# Patient Record
Sex: FEMALE | Race: WHITE | NOT HISPANIC OR LATINO | Employment: OTHER
[De-identification: names, ages, dates, MRNs, and addresses within clinical notes are randomized per-mention and may not be internally consistent; named-entity substitution may affect disease eponyms.]

---

## 2017-01-06 ENCOUNTER — HOSPITAL (OUTPATIENT)
Dept: OTHER | Age: 57
End: 2017-01-06
Attending: OBSTETRICS & GYNECOLOGY

## 2017-05-02 ENCOUNTER — TELEPHONE (OUTPATIENT)
Dept: INTERNAL MEDICINE CLINIC | Facility: CLINIC | Age: 57
End: 2017-05-02

## 2017-05-02 NOTE — TELEPHONE ENCOUNTER
Patient experiencing high anxiety and would only like to see Dr Deepa Ling. Reviewed availability with her and she does not want to wait that long and told her a nurse would have to triage her symptoms. Offered her Dearl John or Dr Ashely Guillermo and again declined.   P

## 2017-05-02 NOTE — TELEPHONE ENCOUNTER
Pt with increased stress and difficulty sleeping. Requesting appointment. Declines appointment with another provider.   Pt scheduled with Dr Clyde King on 5/09 at 1 pm.

## 2017-05-09 ENCOUNTER — OFFICE VISIT (OUTPATIENT)
Dept: INTERNAL MEDICINE CLINIC | Facility: CLINIC | Age: 57
End: 2017-05-09

## 2017-05-09 VITALS
HEIGHT: 68 IN | OXYGEN SATURATION: 97 % | BODY MASS INDEX: 22.85 KG/M2 | WEIGHT: 150.75 LBS | SYSTOLIC BLOOD PRESSURE: 122 MMHG | RESPIRATION RATE: 16 BRPM | HEART RATE: 83 BPM | TEMPERATURE: 98 F | DIASTOLIC BLOOD PRESSURE: 82 MMHG

## 2017-05-09 DIAGNOSIS — Z00.00 ROUTINE GENERAL MEDICAL EXAMINATION AT A HEALTH CARE FACILITY: Primary | ICD-10-CM

## 2017-05-09 DIAGNOSIS — F43.0 ANXIETY IN ACUTE STRESS REACTION: ICD-10-CM

## 2017-05-09 DIAGNOSIS — F41.1 ANXIETY IN ACUTE STRESS REACTION: ICD-10-CM

## 2017-05-09 PROCEDURE — 99396 PREV VISIT EST AGE 40-64: CPT | Performed by: INTERNAL MEDICINE

## 2017-05-09 RX ORDER — ACETAMINOPHEN 160 MG
2000 TABLET,DISINTEGRATING ORAL DAILY
COMMUNITY
End: 2018-05-10

## 2017-05-09 RX ORDER — ALPRAZOLAM 0.25 MG/1
0.25 TABLET ORAL 2 TIMES DAILY PRN
Qty: 60 TABLET | Refills: 0 | Status: SHIPPED | OUTPATIENT
Start: 2017-05-09 | End: 2020-07-23

## 2017-05-09 NOTE — PROGRESS NOTES
Patient presents with: Other: Wellness exam; work-related stress      HPI: The pt presents today for wellness exam and also for eval of work-related stress. She's due for wellness labs.   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 Ayers Bolds Disorder Mother    • Hypertension Brother    • Other[other] [OTHER] Brother    • Heart Disease Brother    • Heart Disease Other      fam hx   • Heart Disease Maternal Grandmother          Immunization History  Administered            Date(s) Administered as Consulting Physician ([facility])  Moe Zamorano MD as Consulting Physician (Cardiovascular Diseases)

## 2017-05-11 PROBLEM — I49.3 PVC (PREMATURE VENTRICULAR CONTRACTION): Status: ACTIVE | Noted: 2017-05-11

## 2017-05-11 PROBLEM — I34.0 NON-RHEUMATIC MITRAL REGURGITATION: Status: ACTIVE | Noted: 2017-05-11

## 2017-05-18 ENCOUNTER — LAB ENCOUNTER (OUTPATIENT)
Dept: LAB | Age: 57
End: 2017-05-18
Attending: INTERNAL MEDICINE
Payer: COMMERCIAL

## 2017-05-18 DIAGNOSIS — Z00.00 ROUTINE GENERAL MEDICAL EXAMINATION AT A HEALTH CARE FACILITY: ICD-10-CM

## 2017-05-18 PROCEDURE — 82306 VITAMIN D 25 HYDROXY: CPT | Performed by: INTERNAL MEDICINE

## 2017-05-18 PROCEDURE — 80061 LIPID PANEL: CPT | Performed by: INTERNAL MEDICINE

## 2017-05-18 PROCEDURE — 83036 HEMOGLOBIN GLYCOSYLATED A1C: CPT | Performed by: INTERNAL MEDICINE

## 2017-05-18 PROCEDURE — 81003 URINALYSIS AUTO W/O SCOPE: CPT | Performed by: INTERNAL MEDICINE

## 2017-05-18 PROCEDURE — 80050 GENERAL HEALTH PANEL: CPT | Performed by: INTERNAL MEDICINE

## 2017-05-18 PROCEDURE — 36415 COLL VENOUS BLD VENIPUNCTURE: CPT | Performed by: INTERNAL MEDICINE

## 2017-08-14 ENCOUNTER — OFFICE VISIT (OUTPATIENT)
Dept: INTERNAL MEDICINE CLINIC | Facility: CLINIC | Age: 57
End: 2017-08-14

## 2017-08-14 ENCOUNTER — LAB ENCOUNTER (OUTPATIENT)
Dept: LAB | Age: 57
End: 2017-08-14
Attending: INTERNAL MEDICINE
Payer: COMMERCIAL

## 2017-08-14 ENCOUNTER — OFFICE VISIT (OUTPATIENT)
Dept: FAMILY MEDICINE CLINIC | Facility: CLINIC | Age: 57
End: 2017-08-14

## 2017-08-14 VITALS
TEMPERATURE: 99 F | OXYGEN SATURATION: 99 % | HEIGHT: 68 IN | SYSTOLIC BLOOD PRESSURE: 130 MMHG | HEART RATE: 65 BPM | DIASTOLIC BLOOD PRESSURE: 80 MMHG | RESPIRATION RATE: 13 BRPM | BODY MASS INDEX: 22.58 KG/M2 | WEIGHT: 149 LBS

## 2017-08-14 VITALS
RESPIRATION RATE: 18 BRPM | TEMPERATURE: 98 F | WEIGHT: 147 LBS | SYSTOLIC BLOOD PRESSURE: 118 MMHG | HEIGHT: 68 IN | HEART RATE: 65 BPM | BODY MASS INDEX: 22.28 KG/M2 | DIASTOLIC BLOOD PRESSURE: 82 MMHG | OXYGEN SATURATION: 99 %

## 2017-08-14 DIAGNOSIS — F41.9 ANXIETY: ICD-10-CM

## 2017-08-14 DIAGNOSIS — Z02.9 ENCOUNTER FOR ADMINISTRATIVE EXAMINATIONS, UNSPECIFIED: Primary | ICD-10-CM

## 2017-08-14 DIAGNOSIS — I34.1 MITRAL VALVE PROLAPSE: ICD-10-CM

## 2017-08-14 DIAGNOSIS — R23.3 PETECHIAL RASH: ICD-10-CM

## 2017-08-14 DIAGNOSIS — R23.3 PETECHIAL RASH: Primary | ICD-10-CM

## 2017-08-14 DIAGNOSIS — E55.9 VITAMIN D INSUFFICIENCY: ICD-10-CM

## 2017-08-14 DIAGNOSIS — I49.3 PVC (PREMATURE VENTRICULAR CONTRACTION): ICD-10-CM

## 2017-08-14 LAB
ALBUMIN SERPL-MCNC: 3.9 G/DL (ref 3.5–4.8)
ALP LIVER SERPL-CCNC: 59 U/L (ref 46–118)
ALT SERPL-CCNC: 24 U/L (ref 14–54)
AST SERPL-CCNC: 15 U/L (ref 15–41)
BASOPHILS # BLD AUTO: 0.04 X10(3) UL (ref 0–0.1)
BASOPHILS NFR BLD AUTO: 0.9 %
BILIRUB SERPL-MCNC: 0.5 MG/DL (ref 0.1–2)
BUN BLD-MCNC: 13 MG/DL (ref 8–20)
CALCIUM BLD-MCNC: 9.1 MG/DL (ref 8.3–10.3)
CHLORIDE: 106 MMOL/L (ref 101–111)
CO2: 31 MMOL/L (ref 22–32)
CONTROL LINE PRESENT WITH A CLEAR BACKGROUND (YES/NO): YES YES/NO
CREAT BLD-MCNC: 0.92 MG/DL (ref 0.55–1.02)
EOSINOPHIL # BLD AUTO: 0.09 X10(3) UL (ref 0–0.3)
EOSINOPHIL NFR BLD AUTO: 2.1 %
ERYTHROCYTE [DISTWIDTH] IN BLOOD BY AUTOMATED COUNT: 12.4 % (ref 11.5–16)
GLUCOSE BLD-MCNC: 91 MG/DL (ref 70–99)
HCT VFR BLD AUTO: 38.8 % (ref 34–50)
HGB BLD-MCNC: 12.8 G/DL (ref 12–16)
IMMATURE GRANULOCYTE COUNT: 0 X10(3) UL (ref 0–1)
IMMATURE GRANULOCYTE RATIO %: 0 %
INR BLD: 1.05 (ref 0.89–1.11)
KIT EXPIRATION DATE: NORMAL DATE
KIT LOT #: NORMAL NUMERIC
LYMPHOCYTES # BLD AUTO: 1.83 X10(3) UL (ref 0.9–4)
LYMPHOCYTES NFR BLD AUTO: 43 %
M PROTEIN MFR SERPL ELPH: 7.4 G/DL (ref 6.1–8.3)
MCH RBC QN AUTO: 28.3 PG (ref 27–33.2)
MCHC RBC AUTO-ENTMCNC: 33 G/DL (ref 31–37)
MCV RBC AUTO: 85.7 FL (ref 81–100)
MONOCYTES # BLD AUTO: 0.39 X10(3) UL (ref 0.1–0.6)
MONOCYTES NFR BLD AUTO: 9.2 %
NEUTROPHIL ABS PRELIM: 1.91 X10 (3) UL (ref 1.3–6.7)
NEUTROPHILS # BLD AUTO: 1.91 X10(3) UL (ref 1.3–6.7)
NEUTROPHILS NFR BLD AUTO: 44.8 %
PLATELET # BLD AUTO: 185 10(3)UL (ref 150–450)
POTASSIUM SERPL-SCNC: 4.5 MMOL/L (ref 3.6–5.1)
PSA SERPL DL<=0.01 NG/ML-MCNC: 13.7 SECONDS (ref 12–14.3)
RBC # BLD AUTO: 4.53 X10(6)UL (ref 3.8–5.1)
RED CELL DISTRIBUTION WIDTH-SD: 39 FL (ref 35.1–46.3)
SODIUM SERPL-SCNC: 142 MMOL/L (ref 136–144)
WBC # BLD AUTO: 4.3 X10(3) UL (ref 4–13)

## 2017-08-14 PROCEDURE — 99214 OFFICE O/P EST MOD 30 MIN: CPT | Performed by: INTERNAL MEDICINE

## 2017-08-14 PROCEDURE — 85610 PROTHROMBIN TIME: CPT | Performed by: INTERNAL MEDICINE

## 2017-08-14 PROCEDURE — 36415 COLL VENOUS BLD VENIPUNCTURE: CPT | Performed by: INTERNAL MEDICINE

## 2017-08-14 PROCEDURE — 85025 COMPLETE CBC W/AUTO DIFF WBC: CPT | Performed by: INTERNAL MEDICINE

## 2017-08-14 PROCEDURE — 87880 STREP A ASSAY W/OPTIC: CPT | Performed by: INTERNAL MEDICINE

## 2017-08-14 PROCEDURE — 85730 THROMBOPLASTIN TIME PARTIAL: CPT | Performed by: INTERNAL MEDICINE

## 2017-08-14 PROCEDURE — 80053 COMPREHEN METABOLIC PANEL: CPT | Performed by: INTERNAL MEDICINE

## 2017-08-14 RX ORDER — CHLORAL HYDRATE 500 MG
1000 CAPSULE ORAL DAILY
COMMUNITY

## 2017-08-14 NOTE — PROGRESS NOTES
Pt presented with small red spots to bilateral lower extremities for approximately 1 week that have been increasing in number. Per patient it goes from ankles to thighs. No pain or drainage. Today, she is feeling tired and not her self.  She started to

## 2017-08-14 NOTE — PROGRESS NOTES
Frieda Bluntkeisha Daily is a 64year old female. HPI:   Patient presents with:  Derm Problem: bilateral lower extrem  Patient presents with acute dermatological complaint. She has developed a rash on her legs. Thinks it started in past week. Not pruritic. dryness (11/01/2011); Myalgia and myositis, unspecified (11/01/2011); Open wound of toe(s), without mention of complication (27/70/4011); Pain in joint, multiple sites (11/01/2011);  Phlebitis and thrombophlebitis of superficial vessels of lower extremities rash.  Contact dermatitis possibility - using sister's fabric softener as of last week. Rapid strep negative. Will check CBC, CMP, PT, PTT as well. Advised patient to inform us if rash worsens. 2.  Anxiety  Stable.   Continue PRN alprazolam.      3/

## 2017-08-14 NOTE — PATIENT INSTRUCTIONS
- Your rapid strep was   - We will check blood work today - will contact you with the results tomorrow. - Call us with any difficulty breathing, drainage from your rash. It was a pleasure seeing you in the clinic today.   Thank you for choosing the [facility]

## 2017-08-15 LAB — APTT PPP: 34.7 SECONDS (ref 25–34)

## 2017-09-08 ENCOUNTER — OFFICE VISIT (OUTPATIENT)
Dept: FAMILY MEDICINE CLINIC | Facility: CLINIC | Age: 57
End: 2017-09-08

## 2017-09-08 VITALS
HEIGHT: 68 IN | BODY MASS INDEX: 22.58 KG/M2 | OXYGEN SATURATION: 99 % | TEMPERATURE: 98 F | WEIGHT: 149 LBS | SYSTOLIC BLOOD PRESSURE: 118 MMHG | DIASTOLIC BLOOD PRESSURE: 88 MMHG | RESPIRATION RATE: 16 BRPM | HEART RATE: 62 BPM

## 2017-09-08 DIAGNOSIS — R05.9 COUGH: Primary | ICD-10-CM

## 2017-09-08 DIAGNOSIS — J02.9 SORE THROAT: ICD-10-CM

## 2017-09-08 PROCEDURE — 99213 OFFICE O/P EST LOW 20 MIN: CPT | Performed by: NURSE PRACTITIONER

## 2017-09-08 RX ORDER — BENZONATATE 200 MG/1
200 CAPSULE ORAL 3 TIMES DAILY PRN
Qty: 30 CAPSULE | Refills: 0 | Status: SHIPPED | OUTPATIENT
Start: 2017-09-08 | End: 2017-09-18

## 2017-09-08 NOTE — PROGRESS NOTES
HPI:   Dodie Espinoza Daily is a 64year old female who presents with ill symptoms for  4-5 days. Patient reports sore throat, congestion, dry cough, chest pain from coughing, body aches, denies fever. Has tried mucinex  with not much relief.        Current Out • Thyroid Disorder Mother    • Heart Disease Other      fam hx   • Heart Disease Maternal Grandmother       Smoking status: Never Smoker                                                              Smokeless tobacco: Never Used                      [employer] [employer] shortness of breath or inability to swallow. The patient indicates understanding of these issues and agrees to the plan.

## 2017-09-08 NOTE — PATIENT INSTRUCTIONS
· Please start Ibuprofen 600 mg every six to eight hours for pain/swelling. · Start Tessalon perles every eight hours with large glass of water for cough. · May continue to use tea with honey or other soothing drinks. Drink often.  Hot steam showers or h

## 2017-09-09 ENCOUNTER — MOBILE ENCOUNTER (OUTPATIENT)
Dept: INTERNAL MEDICINE CLINIC | Facility: CLINIC | Age: 57
End: 2017-09-09

## 2017-09-10 ENCOUNTER — TELEPHONE (OUTPATIENT)
Dept: INTERNAL MEDICINE CLINIC | Facility: CLINIC | Age: 57
End: 2017-09-10

## 2017-09-10 NOTE — TELEPHONE ENCOUNTER
Patient's sister Erma Nesbitt called stating patient's cough was still uncontrolled. The benzonatate perrles were not helping. She was seen by IC on day prior.  I have called in cheratussin for patient and asked them to return to PCP if cough continues not to

## 2017-09-11 ENCOUNTER — TELEPHONE (OUTPATIENT)
Dept: INTERNAL MEDICINE CLINIC | Facility: CLINIC | Age: 57
End: 2017-09-11

## 2017-09-11 NOTE — TELEPHONE ENCOUNTER
Called pt to get more info.  Explained the difference between bacterial infection vs viral infection and pt verbalized to have s&s getting worse/ stated chest pain when coughing and \"feels like bronchitis\"/ x2 episodes of cough spells \"to the point unabl

## 2017-09-11 NOTE — TELEPHONE ENCOUNTER
Patient continues to have cough/nothing is better; increase in congestion- she requested an antibiotic be called in for her.  Please callback to discuss and triage

## 2017-09-12 RX ORDER — AZITHROMYCIN 250 MG/1
TABLET, FILM COATED ORAL
Qty: 6 TABLET | Refills: 0 | Status: SHIPPED | OUTPATIENT
Start: 2017-09-12 | End: 2018-05-10

## 2017-09-12 NOTE — TELEPHONE ENCOUNTER
Call patient. She's been symptomatic essentially since the beginning of the month despite adherence to many conservative measures. I've gone ahead and sent a Z-raimundo to her pharmacy. I would only do a CXR if she doesn't improve. Maximilian Barragan.  Suzan Palomo, [facility] Yanira Nicole

## 2017-12-02 ENCOUNTER — TELEPHONE (OUTPATIENT)
Dept: INTERNAL MEDICINE CLINIC | Facility: CLINIC | Age: 57
End: 2017-12-02

## 2017-12-02 NOTE — TELEPHONE ENCOUNTER
Call patient. Did she get a mammogram this calendar year from her OB/GYN Dr. Payam Arceo? If YES, then please secure a copy of the report. If NO, ask patient if she'd like us to order her test.    Anu ANDINO.  Stacey Cooper MD  Diplomate, American Board of Internal Med

## 2018-01-10 ENCOUNTER — TELEPHONE (OUTPATIENT)
Dept: INTERNAL MEDICINE CLINIC | Facility: CLINIC | Age: 58
End: 2018-01-10

## 2018-01-10 DIAGNOSIS — Z00.00 ROUTINE GENERAL MEDICAL EXAMINATION AT A HEALTH CARE FACILITY: Primary | ICD-10-CM

## 2018-01-10 NOTE — TELEPHONE ENCOUNTER
Patient calling to request an order for lab work. Has appointment on 03/01/18.  Please call patient back

## 2018-01-12 NOTE — TELEPHONE ENCOUNTER
Labs ordered, please get them done 1 week prior to appt. Kassi Naqvi. Gwendlyn Jeans, MD  Diplomate, American Board of Internal Medicine  [address] [facility]  [address] [place] [address],[address], [address], [facility], [address]  T: [phone]; F: [address]

## 2018-01-16 ENCOUNTER — HOSPITAL (OUTPATIENT)
Dept: OTHER | Age: 58
End: 2018-01-16
Attending: OBSTETRICS & GYNECOLOGY

## 2018-01-28 ENCOUNTER — TELEPHONE (OUTPATIENT)
Dept: INTERNAL MEDICINE CLINIC | Facility: CLINIC | Age: 58
End: 2018-01-28

## 2018-01-28 NOTE — TELEPHONE ENCOUNTER
Call patient. I'm doing some record reviews and I don't see her as ever having done colorectal cancer screening via a colonoscopy of via a FIT test.  Ask her which one she'd like to do.   C-scope presumably once every 10 years or stool analysis (FIT) test

## 2018-01-29 NOTE — TELEPHONE ENCOUNTER
Pt stated she had her last colonoscopy done with [address] [place] [address]  when she was 48years old and not due until 10 years from that date.

## 2018-01-30 NOTE — TELEPHONE ENCOUNTER
Spoke with Tracey Chi at [facility] GI report will be faxed to Dr Deniz Mejia.

## 2018-05-01 ENCOUNTER — LAB ENCOUNTER (OUTPATIENT)
Dept: LAB | Age: 58
End: 2018-05-01
Attending: INTERNAL MEDICINE
Payer: COMMERCIAL

## 2018-05-01 DIAGNOSIS — Z00.00 ROUTINE GENERAL MEDICAL EXAMINATION AT A HEALTH CARE FACILITY: ICD-10-CM

## 2018-05-01 PROCEDURE — 80050 GENERAL HEALTH PANEL: CPT | Performed by: INTERNAL MEDICINE

## 2018-05-01 PROCEDURE — 82306 VITAMIN D 25 HYDROXY: CPT | Performed by: INTERNAL MEDICINE

## 2018-05-01 PROCEDURE — 83036 HEMOGLOBIN GLYCOSYLATED A1C: CPT | Performed by: INTERNAL MEDICINE

## 2018-05-01 PROCEDURE — 36415 COLL VENOUS BLD VENIPUNCTURE: CPT | Performed by: INTERNAL MEDICINE

## 2018-05-01 PROCEDURE — 80061 LIPID PANEL: CPT | Performed by: INTERNAL MEDICINE

## 2018-05-01 PROCEDURE — 81003 URINALYSIS AUTO W/O SCOPE: CPT | Performed by: INTERNAL MEDICINE

## 2018-05-10 ENCOUNTER — OFFICE VISIT (OUTPATIENT)
Dept: INTERNAL MEDICINE CLINIC | Facility: CLINIC | Age: 58
End: 2018-05-10

## 2018-05-10 VITALS
RESPIRATION RATE: 13 BRPM | TEMPERATURE: 99 F | HEIGHT: 68 IN | SYSTOLIC BLOOD PRESSURE: 124 MMHG | HEART RATE: 66 BPM | OXYGEN SATURATION: 99 % | BODY MASS INDEX: 23.79 KG/M2 | DIASTOLIC BLOOD PRESSURE: 78 MMHG | WEIGHT: 157 LBS

## 2018-05-10 DIAGNOSIS — Z00.00 ROUTINE GENERAL MEDICAL EXAMINATION AT A HEALTH CARE FACILITY: Primary | ICD-10-CM

## 2018-05-10 PROCEDURE — 99396 PREV VISIT EST AGE 40-64: CPT | Performed by: INTERNAL MEDICINE

## 2018-05-10 RX ORDER — IBUPROFEN 600 MG/1
600 TABLET ORAL 3 TIMES DAILY PRN
Qty: 90 TABLET | Refills: 0 | Status: SHIPPED | OUTPATIENT
Start: 2018-05-10 | End: 2026-05-10

## 2018-05-10 RX ORDER — FLUTICASONE PROPIONATE 50 MCG
2 SPRAY, SUSPENSION (ML) NASAL DAILY
Qty: 1 BOTTLE | Refills: 1 | Status: SHIPPED | OUTPATIENT
Start: 2018-05-10 | End: 2019-04-13 | Stop reason: ALTCHOICE

## 2018-05-10 NOTE — PROGRESS NOTES
Patient presents with:  CPX      HPI: The pt presents today for WWE minus the pap and minus the CBE. Up to date on screening metrics (mammo, pap, CRC). Health goals continue to center around longevity, vitality, and QOL.     Review of Systems   Constituti Age of Onset   • Cancer Father      prostate cancer   • Heart Disorder Father    • Hypertension Father    • Hypertension Brother    • Other[other] [OTHER] Brother    • Heart Disease Brother    • Hypertension Mother    • Other[other] [OTHER] Mother    • Thy 70 - 99 mg/dL 84   BUN      8 - 20 mg/dL 14   CREATININE      0.55 - 1.02 mg/dL 1.00   GFR, Non-      >=60 63   GFR, -American      >=60 72   CALCIUM      8.3 - 10.3 mg/dL 9.1   ALKALINE PHOSPHATASE      46 - 118 U/L 51   AST (SGOT) ability. Bernadine Summers. Deniz Mejia MD  Diplomate, American Board of Internal Medicine  [address] [address] [address]  [facility],4Th Floor, [address], [facility], [address] [place] [address]  T: [phone]; F: [address]     Meds & Refills for this Visit:  No prescriptions requested or or

## 2019-03-11 ENCOUNTER — OFFICE VISIT (OUTPATIENT)
Dept: INTERNAL MEDICINE CLINIC | Facility: CLINIC | Age: 59
End: 2019-03-11
Payer: COMMERCIAL

## 2019-03-11 ENCOUNTER — LAB ENCOUNTER (OUTPATIENT)
Dept: LAB | Age: 59
End: 2019-03-11
Attending: INTERNAL MEDICINE
Payer: COMMERCIAL

## 2019-03-11 VITALS
BODY MASS INDEX: 23.66 KG/M2 | TEMPERATURE: 99 F | WEIGHT: 150.75 LBS | RESPIRATION RATE: 16 BRPM | DIASTOLIC BLOOD PRESSURE: 82 MMHG | SYSTOLIC BLOOD PRESSURE: 136 MMHG | HEART RATE: 68 BPM | HEIGHT: 66.75 IN

## 2019-03-11 DIAGNOSIS — R68.2 DRY MOUTH: Primary | ICD-10-CM

## 2019-03-11 DIAGNOSIS — L60.3 SPLITTING OF NAIL: ICD-10-CM

## 2019-03-11 DIAGNOSIS — R68.2 DRY MOUTH: ICD-10-CM

## 2019-03-11 LAB
DEPRECATED HBV CORE AB SER IA-ACNC: 35.1 NG/ML (ref 18–340)
FOLATE SERPL-MCNC: 54 NG/ML (ref 8.7–?)
IGA SERPL-MCNC: 140 MG/DL (ref 70–312)
IRON SATURATION: 21 % (ref 15–50)
IRON SERPL-MCNC: 78 UG/DL (ref 50–170)
TOTAL IRON BINDING CAPACITY: 380 UG/DL (ref 240–450)
TRANSFERRIN SERPL-MCNC: 255 MG/DL (ref 200–360)
TSI SER-ACNC: 1.73 MIU/ML (ref 0.36–3.74)
VIT B12 SERPL-MCNC: 967 PG/ML (ref 193–986)

## 2019-03-11 PROCEDURE — 84443 ASSAY THYROID STIM HORMONE: CPT | Performed by: INTERNAL MEDICINE

## 2019-03-11 PROCEDURE — 86225 DNA ANTIBODY NATIVE: CPT | Performed by: INTERNAL MEDICINE

## 2019-03-11 PROCEDURE — 82728 ASSAY OF FERRITIN: CPT | Performed by: INTERNAL MEDICINE

## 2019-03-11 PROCEDURE — 86038 ANTINUCLEAR ANTIBODIES: CPT | Performed by: INTERNAL MEDICINE

## 2019-03-11 PROCEDURE — 99214 OFFICE O/P EST MOD 30 MIN: CPT | Performed by: INTERNAL MEDICINE

## 2019-03-11 PROCEDURE — 83540 ASSAY OF IRON: CPT | Performed by: INTERNAL MEDICINE

## 2019-03-11 PROCEDURE — 83550 IRON BINDING TEST: CPT | Performed by: INTERNAL MEDICINE

## 2019-03-11 PROCEDURE — 86256 FLUORESCENT ANTIBODY TITER: CPT | Performed by: INTERNAL MEDICINE

## 2019-03-11 PROCEDURE — 83516 IMMUNOASSAY NONANTIBODY: CPT | Performed by: INTERNAL MEDICINE

## 2019-03-11 PROCEDURE — 82784 ASSAY IGA/IGD/IGG/IGM EACH: CPT | Performed by: INTERNAL MEDICINE

## 2019-03-11 PROCEDURE — 36415 COLL VENOUS BLD VENIPUNCTURE: CPT | Performed by: INTERNAL MEDICINE

## 2019-03-11 PROCEDURE — 82607 VITAMIN B-12: CPT | Performed by: INTERNAL MEDICINE

## 2019-03-11 PROCEDURE — 82746 ASSAY OF FOLIC ACID SERUM: CPT | Performed by: INTERNAL MEDICINE

## 2019-03-11 PROCEDURE — 86235 NUCLEAR ANTIGEN ANTIBODY: CPT | Performed by: INTERNAL MEDICINE

## 2019-03-11 NOTE — PROGRESS NOTES
Patient presents with: Other: dry mouth   Other: nail issues       HPI: Merari Givens presents today for pronounced dry mouth along w/ brittle and splitting nails. Onset = at least the last few weeks, if not longer. No prior hx.   Has tried support measures w Position: Sitting, Cuff Size: adult)   Pulse 68   Temp 98.7 °F (37.1 °C) (Oral)   Resp 16   Ht 66.75\"   Wt 150 lb 12 oz   BMI 23.79 kg/m²   Gen - A&Ox3, NAD  HEENT - PERRL, EOMI, OP is clear w/ some dryness appreciated; TMs clear B  Neck - supple, no JVD,

## 2019-03-14 LAB
GLIAD (DEAMIDATED) AB, IGA: NEGATIVE
GLIAD (DEAMIDATED) AB, IGG: NEGATIVE
TISSUE TRANSGLUTAMINASE AB,IGA: 5.2 U/ML (ref ?–15)
TISSUE TRANSGLUTAMINASE IGA QUALITATIVE: NEGATIVE

## 2019-03-18 LAB
ANA SCREEN: POSITIVE
CENTROMERE AUTOAB: <100 AU/ML (ref ?–100)
DSDNA AUTOAB: <100 IU/ML (ref ?–100)
HISTONE AUTOAB: <100 AU/ML (ref ?–100)
JO-1 AUTOAB: <100 AU/ML (ref ?–100)
RNP AUTOAB: <100 AU/ML (ref ?–100)
SCL-70 AUTOAB: <100 AU/ML (ref ?–100)
SM AUTOAB (SMITH): <100 AU/ML (ref ?–100)
SSA AUTOAB: 408 AU/ML (ref ?–100)
SSB AUTOAB: <100 AU/ML (ref ?–100)

## 2019-04-11 ENCOUNTER — TELEPHONE (OUTPATIENT)
Dept: INTERNAL MEDICINE CLINIC | Facility: CLINIC | Age: 59
End: 2019-04-11

## 2019-04-11 NOTE — TELEPHONE ENCOUNTER
Pt informed of Dr Alex Shallow recommendation and pt verbalized to be upset. Pt has scheduled an ov debby Aviles NP for Saturday and also recommended if s/s get worse to proceed to ER pt verbalized understanding.

## 2019-04-11 NOTE — TELEPHONE ENCOUNTER
Pt called to request an abdominal ultrasound due to \"a previous gallbladder attack\". Pt stated had severe abdominal pain yesterday for a few hours and went to take a nap. Severe pain decrease, pt denied having pain any longer.    Pt denied as follows fe

## 2019-04-11 NOTE — TELEPHONE ENCOUNTER
Clinical examination is warranted. This can be done in the [facility] for rapid diagnosis and evaluation. Adams Hopper. Laury Hardin MD  Diplomate, American Board of Internal Medicine  [address] [facility]  [address], [address], [facility], [address]  T: [phone]

## 2019-04-11 NOTE — TELEPHONE ENCOUNTER
Patient calling for update on Dr Deepa Ling and either just going for ultrasound or coming in for an appointment.   Per note in chart we are still waiting for Dr Deepa Ling to advise, informed patient he is in clinic and seeing patient and nurse will call her when he

## 2019-04-13 ENCOUNTER — OFFICE VISIT (OUTPATIENT)
Dept: INTERNAL MEDICINE CLINIC | Facility: CLINIC | Age: 59
End: 2019-04-13
Payer: COMMERCIAL

## 2019-04-13 VITALS
WEIGHT: 148.75 LBS | DIASTOLIC BLOOD PRESSURE: 68 MMHG | BODY MASS INDEX: 23.35 KG/M2 | SYSTOLIC BLOOD PRESSURE: 122 MMHG | RESPIRATION RATE: 16 BRPM | OXYGEN SATURATION: 98 % | HEART RATE: 66 BPM | HEIGHT: 66.75 IN | TEMPERATURE: 98 F

## 2019-04-13 DIAGNOSIS — R10.13 EPIGASTRIC PAIN: Primary | ICD-10-CM

## 2019-04-13 DIAGNOSIS — R10.33 PERIUMBILICAL ABDOMINAL PAIN: ICD-10-CM

## 2019-04-13 PROCEDURE — 99213 OFFICE O/P EST LOW 20 MIN: CPT | Performed by: NURSE PRACTITIONER

## 2019-04-13 NOTE — PROGRESS NOTES
Patient presents with:  Abdominal Pain: Pt states pain was just going on for 1 day. Went away after nap. Pt Described pain as it hurts like hell, pt came back from lunch and her abdominal area was hard and pain intense. About 1:30 pt had to go home.  Pt sta 08/15/2012 16     ALT (U/L)   Date Value   05/01/2018 28   08/14/2017 24   05/18/2017 22   08/15/2012 23        Current Outpatient Medications:  Nutritional Supplements (JUICE PLUS FIBRE OR) Take by mouth.  Disp:  Rfl:    MISC NATURAL PRODUCT OP Apply to Smokeless tobacco: Never Used    Alcohol use: Yes      Comment: rare    Drug use: No        REVIEW OF SYSTEMS:   GENERAL: feels well otherwise, no lethargy, no fever, no chills  SKIN: denies any unusual skin lesions, rash  HEENT:no abnormal taste  LUNGS: d

## 2019-04-22 ENCOUNTER — HOSPITAL ENCOUNTER (OUTPATIENT)
Dept: ULTRASOUND IMAGING | Age: 59
Discharge: HOME | End: 2019-04-22
Attending: NURSE PRACTITIONER
Payer: COMMERCIAL

## 2019-04-22 DIAGNOSIS — R10.13 EPIGASTRIC PAIN: ICD-10-CM

## 2019-04-22 DIAGNOSIS — R10.33 PERIUMBILICAL ABDOMINAL PAIN: ICD-10-CM

## 2019-04-22 PROCEDURE — 76700 US EXAM ABDOM COMPLETE: CPT | Performed by: NURSE PRACTITIONER

## 2019-05-04 ENCOUNTER — HOSPITAL (OUTPATIENT)
Dept: OTHER | Age: 59
End: 2019-05-04
Attending: OBSTETRICS & GYNECOLOGY

## 2019-05-11 ENCOUNTER — TELEPHONE (OUTPATIENT)
Dept: INTERNAL MEDICINE CLINIC | Facility: CLINIC | Age: 59
End: 2019-05-11

## 2019-05-11 ENCOUNTER — OFFICE VISIT (OUTPATIENT)
Dept: INTERNAL MEDICINE CLINIC | Facility: CLINIC | Age: 59
End: 2019-05-11
Payer: COMMERCIAL

## 2019-05-11 VITALS
HEIGHT: 68 IN | DIASTOLIC BLOOD PRESSURE: 82 MMHG | TEMPERATURE: 98 F | OXYGEN SATURATION: 99 % | BODY MASS INDEX: 22.92 KG/M2 | RESPIRATION RATE: 16 BRPM | SYSTOLIC BLOOD PRESSURE: 110 MMHG | WEIGHT: 151.25 LBS | HEART RATE: 72 BPM

## 2019-05-11 DIAGNOSIS — Z00.00 ROUTINE GENERAL MEDICAL EXAMINATION AT A HEALTH CARE FACILITY: Primary | ICD-10-CM

## 2019-05-11 DIAGNOSIS — Z23 NEED FOR SHINGLES VACCINE: ICD-10-CM

## 2019-05-11 PROCEDURE — 90471 IMMUNIZATION ADMIN: CPT | Performed by: INTERNAL MEDICINE

## 2019-05-11 PROCEDURE — 90750 HZV VACC RECOMBINANT IM: CPT | Performed by: INTERNAL MEDICINE

## 2019-05-11 PROCEDURE — 99396 PREV VISIT EST AGE 40-64: CPT | Performed by: INTERNAL MEDICINE

## 2019-05-11 NOTE — TELEPHONE ENCOUNTER
Pt due for Shingrix #2 July 2019. Only willing to schedule on a Saturday either with nurse or provider. Pt states she will not get 2nd injection unless she is able to schedule on a Saturday     Ok to schedule on a Saturday ?

## 2019-05-11 NOTE — PATIENT INSTRUCTIONS
Medardo Harrington, my e-mail is [email or web address]. If I could be of any other service, please do not hesitate to contact me. Our office phone number is [phone], or please feel free to send us a secured message through [address]   On behalf of my staf

## 2019-05-11 NOTE — PROGRESS NOTES
Patient presents with:  Physical      HPI: Shaw Campos presents today for WWE. Doing well. Due for wellness labs. Health goals continue to center around longevity, vitality, and QOL. Review of Systems   All other systems reviewed and are negative.     Pa History  Administered            Date(s) Administered    TDAP                  06/05/2015        PE:  /82   Pulse 72   Temp 98.2 °F (36.8 °C) (Oral)   Resp 16   Ht 68\"   Wt 151 lb 4 oz   SpO2 99%   BMI 23.00 kg/m²   Wt Readings from Last 6 Encounter

## 2019-05-13 NOTE — TELEPHONE ENCOUNTER
Please contact pt to schedule a nurse visit during the week or pt can wait to schedule closer to July when the MA schedule is out.

## 2019-05-21 ENCOUNTER — LAB ENCOUNTER (OUTPATIENT)
Dept: LAB | Age: 59
End: 2019-05-21
Attending: INTERNAL MEDICINE
Payer: COMMERCIAL

## 2019-05-21 ENCOUNTER — HOSPITAL ENCOUNTER (OUTPATIENT)
Dept: CV DIAGNOSTICS | Age: 59
Discharge: HOME | End: 2019-05-21
Attending: INTERNAL MEDICINE
Payer: COMMERCIAL

## 2019-05-21 DIAGNOSIS — Z00.00 ROUTINE GENERAL MEDICAL EXAMINATION AT A HEALTH CARE FACILITY: ICD-10-CM

## 2019-05-21 DIAGNOSIS — I34.1 MVP (MITRAL VALVE PROLAPSE): ICD-10-CM

## 2019-05-21 DIAGNOSIS — I49.3 PVC (PREMATURE VENTRICULAR CONTRACTION): ICD-10-CM

## 2019-05-21 PROCEDURE — 36415 COLL VENOUS BLD VENIPUNCTURE: CPT | Performed by: INTERNAL MEDICINE

## 2019-05-21 PROCEDURE — 81003 URINALYSIS AUTO W/O SCOPE: CPT | Performed by: INTERNAL MEDICINE

## 2019-05-21 PROCEDURE — 83036 HEMOGLOBIN GLYCOSYLATED A1C: CPT | Performed by: INTERNAL MEDICINE

## 2019-05-21 PROCEDURE — 93306 TTE W/DOPPLER COMPLETE: CPT | Performed by: INTERNAL MEDICINE

## 2019-05-21 PROCEDURE — 85025 COMPLETE CBC W/AUTO DIFF WBC: CPT | Performed by: INTERNAL MEDICINE

## 2019-05-21 PROCEDURE — 80061 LIPID PANEL: CPT | Performed by: INTERNAL MEDICINE

## 2019-06-04 NOTE — TELEPHONE ENCOUNTER
Nurse visit scheduled for same Saturday provider/MA scheduled    Please check inventory/orders prior to pt appt

## 2019-07-06 ENCOUNTER — NURSE ONLY (OUTPATIENT)
Dept: INTERNAL MEDICINE CLINIC | Facility: CLINIC | Age: 59
End: 2019-07-06
Payer: COMMERCIAL

## 2019-07-06 PROCEDURE — 90471 IMMUNIZATION ADMIN: CPT | Performed by: INTERNAL MEDICINE

## 2019-07-06 PROCEDURE — 90750 HZV VACC RECOMBINANT IM: CPT | Performed by: INTERNAL MEDICINE

## 2020-02-25 ENCOUNTER — OFFICE VISIT (OUTPATIENT)
Dept: INTERNAL MEDICINE CLINIC | Facility: CLINIC | Age: 60
End: 2020-02-25
Payer: COMMERCIAL

## 2020-02-25 VITALS
DIASTOLIC BLOOD PRESSURE: 70 MMHG | SYSTOLIC BLOOD PRESSURE: 122 MMHG | RESPIRATION RATE: 12 BRPM | WEIGHT: 153.75 LBS | BODY MASS INDEX: 24.13 KG/M2 | OXYGEN SATURATION: 98 % | HEART RATE: 74 BPM | TEMPERATURE: 99 F | HEIGHT: 66.75 IN

## 2020-02-25 DIAGNOSIS — B35.4 TINEA CORPORIS: Primary | ICD-10-CM

## 2020-02-25 PROCEDURE — 99213 OFFICE O/P EST LOW 20 MIN: CPT | Performed by: PHYSICIAN ASSISTANT

## 2020-02-25 RX ORDER — CLOTRIMAZOLE AND BETAMETHASONE DIPROPIONATE 10; .64 MG/G; MG/G
CREAM TOPICAL
Qty: 15 G | Refills: 0 | Status: SHIPPED | OUTPATIENT
Start: 2020-02-25

## 2020-02-25 NOTE — PROGRESS NOTES
Rodrick Villeda Daily is a 61year old female. HPI:   Patient presents for eval of a rash on her left lower leg x 3 weeks. C/o red raised circular patch which is itchy. No change in size. Denies burning, bleeding.   Has been using hydrocortisone ointment lower leg has a 2x2 cm pink annular patchy/slightly scaly lesion    ASSESSMENT AND PLAN:   # Tinea corporis: stop hydrocortisone ointment. Start betamethasone/clotrimazole cream x 2-4 weeks (use until lesion clears +1 add'l week).     The patient indicates

## 2020-02-25 NOTE — PATIENT INSTRUCTIONS
Stop using hydrocortisone ointment. Apply clotrimazole/betamethasone cream - thin layer twice a day x 2-4 weeks. Once lesion has cleared please apply for one more week. If NO improvement after 3 weeks please see dermatology.

## 2020-05-28 DIAGNOSIS — Z12.31 ENCOUNTER FOR SCREENING MAMMOGRAM FOR MALIGNANT NEOPLASM OF BREAST: Primary | ICD-10-CM

## 2020-06-12 ENCOUNTER — HOSPITAL ENCOUNTER (OUTPATIENT)
Dept: MAMMOGRAPHY | Age: 60
Discharge: HOME | End: 2020-06-12
Attending: OBSTETRICS & GYNECOLOGY

## 2020-06-12 DIAGNOSIS — Z12.31 ENCOUNTER FOR SCREENING MAMMOGRAM FOR MALIGNANT NEOPLASM OF BREAST: ICD-10-CM

## 2020-06-12 PROCEDURE — 77063 BREAST TOMOSYNTHESIS BI: CPT

## 2020-07-16 ENCOUNTER — TELEPHONE (OUTPATIENT)
Dept: INTERNAL MEDICINE CLINIC | Facility: CLINIC | Age: 60
End: 2020-07-16

## 2020-07-16 DIAGNOSIS — Z00.00 ROUTINE GENERAL MEDICAL EXAMINATION AT A HEALTH CARE FACILITY: Primary | ICD-10-CM

## 2020-07-16 NOTE — TELEPHONE ENCOUNTER
Patient calling for virtual/dox medication follow up visit; she is taking early FPC and needs to schedule her labs with [facility] asap to use up her flex plan account,can we enter orders for her today?  She is scheduled for visit on the [facility]

## 2020-07-17 NOTE — TELEPHONE ENCOUNTER
Labs ordered. I placed them under annual CPX orders since her last wellness exam was on 5/11/19. Lieutenant Ruiz. Jaylan Goyal MD  Diplomate, American Board of Internal Medicine  Member, [employer] of [address]  [address].  Ernie Castro,

## 2020-07-20 ENCOUNTER — LAB ENCOUNTER (OUTPATIENT)
Dept: LAB | Age: 60
End: 2020-07-20
Attending: INTERNAL MEDICINE
Payer: COMMERCIAL

## 2020-07-20 DIAGNOSIS — Z00.00 ROUTINE GENERAL MEDICAL EXAMINATION AT A HEALTH CARE FACILITY: ICD-10-CM

## 2020-07-20 LAB
ALBUMIN SERPL-MCNC: 4.1 G/DL (ref 3.4–5)
ALBUMIN/GLOB SERPL: 1.1 {RATIO} (ref 1–2)
ALP LIVER SERPL-CCNC: 56 U/L (ref 46–118)
ALT SERPL-CCNC: 23 U/L (ref 13–56)
ANION GAP SERPL CALC-SCNC: 4 MMOL/L (ref 0–18)
AST SERPL-CCNC: 11 U/L (ref 15–37)
BASOPHILS # BLD AUTO: 0.06 X10(3) UL (ref 0–0.2)
BASOPHILS NFR BLD AUTO: 1.7 %
BILIRUB SERPL-MCNC: 0.7 MG/DL (ref 0.1–2)
BILIRUB UR QL STRIP.AUTO: NEGATIVE
BUN BLD-MCNC: 11 MG/DL (ref 7–18)
BUN/CREAT SERPL: 11.1 (ref 10–20)
CALCIUM BLD-MCNC: 9.4 MG/DL (ref 8.5–10.1)
CHLORIDE SERPL-SCNC: 105 MMOL/L (ref 98–112)
CHOLEST SMN-MCNC: 226 MG/DL (ref ?–200)
CLARITY UR REFRACT.AUTO: CLEAR
CO2 SERPL-SCNC: 30 MMOL/L (ref 21–32)
COLOR UR AUTO: COLORLESS
CREAT BLD-MCNC: 0.99 MG/DL (ref 0.55–1.02)
DEPRECATED RDW RBC AUTO: 39.5 FL (ref 35.1–46.3)
EOSINOPHIL # BLD AUTO: 0.08 X10(3) UL (ref 0–0.7)
EOSINOPHIL NFR BLD AUTO: 2.2 %
ERYTHROCYTE [DISTWIDTH] IN BLOOD BY AUTOMATED COUNT: 12.3 % (ref 11–15)
EST. AVERAGE GLUCOSE BLD GHB EST-MCNC: 111 MG/DL (ref 68–126)
GLOBULIN PLAS-MCNC: 3.7 G/DL (ref 2.8–4.4)
GLUCOSE BLD-MCNC: 96 MG/DL (ref 70–99)
GLUCOSE UR STRIP.AUTO-MCNC: NEGATIVE MG/DL
HBA1C MFR BLD HPLC: 5.5 % (ref ?–5.7)
HCT VFR BLD AUTO: 40.6 % (ref 35–48)
HDLC SERPL-MCNC: 73 MG/DL (ref 40–59)
HGB BLD-MCNC: 13.5 G/DL (ref 12–16)
IMM GRANULOCYTES # BLD AUTO: 0.01 X10(3) UL (ref 0–1)
IMM GRANULOCYTES NFR BLD: 0.3 %
KETONES UR STRIP.AUTO-MCNC: NEGATIVE MG/DL
LDLC SERPL CALC-MCNC: 146 MG/DL (ref ?–100)
LEUKOCYTE ESTERASE UR QL STRIP.AUTO: NEGATIVE
LYMPHOCYTES # BLD AUTO: 1.43 X10(3) UL (ref 1–4)
LYMPHOCYTES NFR BLD AUTO: 40.2 %
M PROTEIN MFR SERPL ELPH: 7.8 G/DL (ref 6.4–8.2)
MCH RBC QN AUTO: 28.8 PG (ref 26–34)
MCHC RBC AUTO-ENTMCNC: 33.3 G/DL (ref 31–37)
MCV RBC AUTO: 86.8 FL (ref 80–100)
MONOCYTES # BLD AUTO: 0.44 X10(3) UL (ref 0.1–1)
MONOCYTES NFR BLD AUTO: 12.4 %
NEUTROPHILS # BLD AUTO: 1.54 X10 (3) UL (ref 1.5–7.7)
NEUTROPHILS # BLD AUTO: 1.54 X10(3) UL (ref 1.5–7.7)
NEUTROPHILS NFR BLD AUTO: 43.2 %
NITRITE UR QL STRIP.AUTO: NEGATIVE
NONHDLC SERPL-MCNC: 153 MG/DL (ref ?–130)
OSMOLALITY SERPL CALC.SUM OF ELEC: 287 MOSM/KG (ref 275–295)
PATIENT FASTING Y/N/NP: YES
PATIENT FASTING Y/N/NP: YES
PH UR STRIP.AUTO: 7 [PH] (ref 4.5–8)
PLATELET # BLD AUTO: 199 10(3)UL (ref 150–450)
POTASSIUM SERPL-SCNC: 4.8 MMOL/L (ref 3.5–5.1)
PROT UR STRIP.AUTO-MCNC: NEGATIVE MG/DL
RBC # BLD AUTO: 4.68 X10(6)UL (ref 3.8–5.3)
RBC UR QL AUTO: NEGATIVE
SODIUM SERPL-SCNC: 139 MMOL/L (ref 136–145)
SP GR UR STRIP.AUTO: <1.005 (ref 1–1.03)
TRIGL SERPL-MCNC: 34 MG/DL (ref 30–149)
TSI SER-ACNC: 1.73 MIU/ML (ref 0.36–3.74)
UROBILINOGEN UR STRIP.AUTO-MCNC: <2 MG/DL
VLDLC SERPL CALC-MCNC: 7 MG/DL (ref 0–30)
WBC # BLD AUTO: 3.6 X10(3) UL (ref 4–11)

## 2020-07-20 PROCEDURE — 84443 ASSAY THYROID STIM HORMONE: CPT

## 2020-07-20 PROCEDURE — 83036 HEMOGLOBIN GLYCOSYLATED A1C: CPT

## 2020-07-20 PROCEDURE — 85025 COMPLETE CBC W/AUTO DIFF WBC: CPT

## 2020-07-20 PROCEDURE — 80053 COMPREHEN METABOLIC PANEL: CPT

## 2020-07-20 PROCEDURE — 36415 COLL VENOUS BLD VENIPUNCTURE: CPT

## 2020-07-20 PROCEDURE — 80061 LIPID PANEL: CPT

## 2020-07-20 PROCEDURE — 81003 URINALYSIS AUTO W/O SCOPE: CPT

## 2020-07-23 ENCOUNTER — VIRTUAL PHONE E/M (OUTPATIENT)
Dept: INTERNAL MEDICINE CLINIC | Facility: CLINIC | Age: 60
End: 2020-07-23
Payer: COMMERCIAL

## 2020-07-23 DIAGNOSIS — F41.1 GAD (GENERALIZED ANXIETY DISORDER): ICD-10-CM

## 2020-07-23 DIAGNOSIS — J30.9 CHRONIC ALLERGIC RHINITIS: ICD-10-CM

## 2020-07-23 DIAGNOSIS — F51.01 PRIMARY INSOMNIA: ICD-10-CM

## 2020-07-23 DIAGNOSIS — E78.00 HYPERCHOLESTEROLEMIA: Primary | ICD-10-CM

## 2020-07-23 DIAGNOSIS — M62.830 MUSCLE SPASM OF BACK: ICD-10-CM

## 2020-07-23 PROCEDURE — 99214 OFFICE O/P EST MOD 30 MIN: CPT | Performed by: INTERNAL MEDICINE

## 2020-07-23 RX ORDER — LORATADINE AND PSEUDOEPHEDRINE 10; 240 MG/1; MG/1
1 TABLET, EXTENDED RELEASE ORAL
Qty: 90 TABLET | Refills: 1 | Status: SHIPPED | OUTPATIENT
Start: 2020-07-23

## 2020-07-23 RX ORDER — ALPRAZOLAM 0.25 MG/1
0.25 TABLET ORAL 2 TIMES DAILY PRN
Qty: 60 TABLET | Refills: 2 | Status: SHIPPED | OUTPATIENT
Start: 2020-07-23

## 2020-07-23 RX ORDER — CYCLOBENZAPRINE HCL 10 MG
10 TABLET ORAL 3 TIMES DAILY PRN
Qty: 30 TABLET | Refills: 2 | Status: SHIPPED | OUTPATIENT
Start: 2020-07-23

## 2020-07-23 NOTE — PROGRESS NOTES
Virtual Telephone Check-In    Malgorzata Corrales Daily verbally consents to a Virtual/Telephone Check-In visit on 07/23/20. Patient has been referred to the [facility] website at [email or web address] to review the yearly Consent to Treat document.     Patient unders 28.35 g, Rfl: 3  •  Nutritional Supplements (JUICE PLUS FIBRE OR), Take by mouth., Disp: , Rfl:   •  MISC NATURAL PRODUCT OP, Apply to eye., Disp: , Rfl:   •  ibuprofen 600 MG Oral Tab, Take 1 tablet (600 mg total) by mouth 3 (three) times daily as needed %      %  1.7   Immature Granulocyte %      %  0.3   Glucose      70 - 99 mg/dL  96   Sodium      136 - 145 mmol/L  139   Potassium      3.5 - 5.1 mmol/L  4.8   Chloride      98 - 112 mmol/L  105   Carbon Dioxide, Total      21.0 - 32.0 mmol/L  30.0   ANIO disorder)  Primary insomnia  Muscle spasm of back    1. Hypercholesterolemia - Just went for labs (see above). Here for next steps in mgmt. No medication needed at this time. Push lifestyle measures.   2. Chronic allergic rhinitis - Has been taking Claritin

## 2020-09-23 ENCOUNTER — TELEPHONE (OUTPATIENT)
Dept: ORTHOPEDICS | Age: 60
End: 2020-09-23

## 2020-10-29 ENCOUNTER — TELEPHONE (OUTPATIENT)
Dept: INTERNAL MEDICINE CLINIC | Facility: CLINIC | Age: 60
End: 2020-10-29

## 2020-10-29 DIAGNOSIS — Z23 FLU VACCINE NEED: Primary | ICD-10-CM

## 2020-10-29 NOTE — TELEPHONE ENCOUNTER
Patient had a virtual appointment with Dr. Ricarda Narayanan on 07/23. Patient would like to schedule her flu shot. She is due for her physical and she stated that Dr. Ricarda Narayanan did her physical during the video visit. Is it ok to schedule patient's flu shot?

## 2020-11-02 NOTE — TELEPHONE ENCOUNTER
OK to give flu shot. Vaccine order placed. Jeanette Ledesma. Isai Chua MD  Diplomate, American Board of Internal Medicine  Member, [facility] ([email or web address])  Affiliate Physician, [facility] (ww

## 2020-11-03 NOTE — TELEPHONE ENCOUNTER
Future Appointments   Date Time Provider Shilo Dacosta   11/6/2020  9:30 AM [facility] 08 NURSE [facility] 8 [facility] [place]

## 2020-11-06 ENCOUNTER — IMMUNIZATION (OUTPATIENT)
Dept: INTERNAL MEDICINE CLINIC | Facility: CLINIC | Age: 60
End: 2020-11-06
Payer: COMMERCIAL

## 2020-11-06 DIAGNOSIS — Z23 NEED FOR VACCINATION: ICD-10-CM

## 2020-11-06 PROCEDURE — 90686 IIV4 VACC NO PRSV 0.5 ML IM: CPT | Performed by: INTERNAL MEDICINE

## 2020-11-06 PROCEDURE — 90471 IMMUNIZATION ADMIN: CPT | Performed by: INTERNAL MEDICINE

## 2020-12-17 ENCOUNTER — TELEPHONE (OUTPATIENT)
Dept: INTERNAL MEDICINE CLINIC | Facility: CLINIC | Age: 60
End: 2020-12-17

## 2020-12-17 NOTE — TELEPHONE ENCOUNTER
Patient called and stated that she woke up yesterday with cracks on the corner of her lips. She stated that it's hard to eat and drink. She would like to speak with a nurse. Please advise.

## 2020-12-17 NOTE — TELEPHONE ENCOUNTER
Pt states that yesterday she noticed cracks to the inside of both corners of mouth. Also states that for the last few months she has woken up to an extremely dry mouth/tongue. Denies lesion inside of mouth, area of concern having drainage, crusting.

## 2020-12-23 ENCOUNTER — TELEMEDICINE (OUTPATIENT)
Dept: INTERNAL MEDICINE CLINIC | Facility: CLINIC | Age: 60
End: 2020-12-23
Payer: COMMERCIAL

## 2020-12-23 DIAGNOSIS — H04.123 DRY EYES: Primary | ICD-10-CM

## 2020-12-23 DIAGNOSIS — R68.2 DRY MOUTH: ICD-10-CM

## 2020-12-23 PROCEDURE — 99213 OFFICE O/P EST LOW 20 MIN: CPT | Performed by: INTERNAL MEDICINE

## 2020-12-23 NOTE — PROGRESS NOTES
Magdiel Cedeno Daily is a 61year old female here today for a telemedicine/video visit via [facility]. Patient is in the state of [place] during this visit. Magdiel Cedeno Daily verbally consents to a Video visit service on 12/23/20.   Patient understands and accep normal lid and conjunctiva  NECK: no grossly visible jvd or thyromegaly  LUNGS: speaking in full sentences, no increased work of breathing, no audible wheezing  NEURO: alert and oriented x 3  PSYCH: pleasant, appropriate mood and affect    ASSESSMENT/PLAN:

## 2020-12-31 ENCOUNTER — LAB ENCOUNTER (OUTPATIENT)
Dept: LAB | Age: 60
End: 2020-12-31
Attending: INTERNAL MEDICINE
Payer: COMMERCIAL

## 2020-12-31 DIAGNOSIS — H04.123 DRY EYES: ICD-10-CM

## 2020-12-31 DIAGNOSIS — R68.2 DRY MOUTH: ICD-10-CM

## 2020-12-31 LAB
ALBUMIN SERPL-MCNC: 4 G/DL (ref 3.4–5)
ALBUMIN/GLOB SERPL: 1.2 {RATIO} (ref 1–2)
ALP LIVER SERPL-CCNC: 62 U/L
ALT SERPL-CCNC: 43 U/L
ANION GAP SERPL CALC-SCNC: 3 MMOL/L (ref 0–18)
AST SERPL-CCNC: 31 U/L (ref 15–37)
BASOPHILS # BLD AUTO: 0.06 X10(3) UL (ref 0–0.2)
BASOPHILS NFR BLD AUTO: 1.3 %
BILIRUB SERPL-MCNC: 0.6 MG/DL (ref 0.1–2)
BUN BLD-MCNC: 11 MG/DL (ref 7–18)
BUN/CREAT SERPL: 12.4 (ref 10–20)
CALCIUM BLD-MCNC: 9.2 MG/DL (ref 8.5–10.1)
CHLORIDE SERPL-SCNC: 106 MMOL/L (ref 98–112)
CO2 SERPL-SCNC: 29 MMOL/L (ref 21–32)
CREAT BLD-MCNC: 0.89 MG/DL
DEPRECATED RDW RBC AUTO: 40 FL (ref 35.1–46.3)
EOSINOPHIL # BLD AUTO: 0.12 X10(3) UL (ref 0–0.7)
EOSINOPHIL NFR BLD AUTO: 2.6 %
ERYTHROCYTE [DISTWIDTH] IN BLOOD BY AUTOMATED COUNT: 13 % (ref 11–15)
GLOBULIN PLAS-MCNC: 3.3 G/DL (ref 2.8–4.4)
GLUCOSE BLD-MCNC: 94 MG/DL (ref 70–99)
HCT VFR BLD AUTO: 39.2 %
HGB BLD-MCNC: 13.4 G/DL
IMM GRANULOCYTES # BLD AUTO: 0.01 X10(3) UL (ref 0–1)
IMM GRANULOCYTES NFR BLD: 0.2 %
LYMPHOCYTES # BLD AUTO: 1.8 X10(3) UL (ref 1–4)
LYMPHOCYTES NFR BLD AUTO: 39.6 %
M PROTEIN MFR SERPL ELPH: 7.3 G/DL (ref 6.4–8.2)
MCH RBC QN AUTO: 29 PG (ref 26–34)
MCHC RBC AUTO-ENTMCNC: 34.2 G/DL (ref 31–37)
MCV RBC AUTO: 84.8 FL
MONOCYTES # BLD AUTO: 0.45 X10(3) UL (ref 0.1–1)
MONOCYTES NFR BLD AUTO: 9.9 %
NEUTROPHILS # BLD AUTO: 2.1 X10 (3) UL (ref 1.5–7.7)
NEUTROPHILS # BLD AUTO: 2.1 X10(3) UL (ref 1.5–7.7)
NEUTROPHILS NFR BLD AUTO: 46.4 %
OSMOLALITY SERPL CALC.SUM OF ELEC: 285 MOSM/KG (ref 275–295)
PLATELET # BLD AUTO: 200 10(3)UL (ref 150–450)
POTASSIUM SERPL-SCNC: 4.6 MMOL/L (ref 3.5–5.1)
RBC # BLD AUTO: 4.62 X10(6)UL
SODIUM SERPL-SCNC: 138 MMOL/L (ref 136–145)
VIT B12 SERPL-MCNC: 702 PG/ML (ref 193–986)
WBC # BLD AUTO: 4.5 X10(3) UL (ref 4–11)

## 2020-12-31 PROCEDURE — 86225 DNA ANTIBODY NATIVE: CPT

## 2020-12-31 PROCEDURE — 86038 ANTINUCLEAR ANTIBODIES: CPT

## 2020-12-31 PROCEDURE — 86235 NUCLEAR ANTIGEN ANTIBODY: CPT

## 2020-12-31 PROCEDURE — 36415 COLL VENOUS BLD VENIPUNCTURE: CPT

## 2020-12-31 PROCEDURE — 82607 VITAMIN B-12: CPT

## 2020-12-31 PROCEDURE — 80053 COMPREHEN METABOLIC PANEL: CPT

## 2020-12-31 PROCEDURE — 85025 COMPLETE CBC W/AUTO DIFF WBC: CPT

## 2021-01-04 LAB
ANA SCREEN: POSITIVE
CENTROMERE AUTOAB: <100 AU/ML (ref ?–100)
DSDNA AUTOAB: <100 IU/ML (ref ?–100)
HISTONE AUTOAB: <100 AU/ML (ref ?–100)
JO-1 AUTOAB: <100 AU/ML (ref ?–100)
RNP AUTOAB: <100 AU/ML (ref ?–100)
SCL-70 AUTOAB: <100 AU/ML (ref ?–100)
SM AUTOAB (SMITH): <100 AU/ML (ref ?–100)
SSA AUTOAB: 131 AU/ML (ref ?–100)
SSB AUTOAB: <100 AU/ML (ref ?–100)

## 2021-04-16 ENCOUNTER — TELEPHONE (OUTPATIENT)
Dept: INTERNAL MEDICINE CLINIC | Facility: CLINIC | Age: 61
End: 2021-04-16

## 2021-04-16 NOTE — TELEPHONE ENCOUNTER
Patient wanted to update her chart that she has received both vaccines for covid.      Kvng   1st dose- 3/23   2nd dose- 4/14     [facility] location

## 2021-05-05 NOTE — TELEPHONE ENCOUNTER
Message left on voicemail fasting labs ordered. Tazorac Pregnancy And Lactation Text: This medication is not safe during pregnancy. It is unknown if this medication is excreted in breast milk.

## 2021-06-10 ENCOUNTER — OFFICE VISIT (OUTPATIENT)
Dept: INTERNAL MEDICINE CLINIC | Facility: CLINIC | Age: 61
End: 2021-06-10
Payer: COMMERCIAL

## 2021-06-10 VITALS
HEIGHT: 67 IN | RESPIRATION RATE: 14 BRPM | HEART RATE: 80 BPM | WEIGHT: 150.38 LBS | BODY MASS INDEX: 23.6 KG/M2 | TEMPERATURE: 98 F | DIASTOLIC BLOOD PRESSURE: 70 MMHG | OXYGEN SATURATION: 98 % | SYSTOLIC BLOOD PRESSURE: 124 MMHG

## 2021-06-10 DIAGNOSIS — E78.00 HYPERCHOLESTEROLEMIA: ICD-10-CM

## 2021-06-10 DIAGNOSIS — L24.9 IRRITANT CONTACT DERMATITIS, UNSPECIFIED TRIGGER: ICD-10-CM

## 2021-06-10 DIAGNOSIS — B35.1 ONYCHOMYCOSIS: ICD-10-CM

## 2021-06-10 DIAGNOSIS — Z00.00 ANNUAL PHYSICAL EXAM: Primary | ICD-10-CM

## 2021-06-10 PROCEDURE — 99213 OFFICE O/P EST LOW 20 MIN: CPT | Performed by: PHYSICIAN ASSISTANT

## 2021-06-10 PROCEDURE — 99396 PREV VISIT EST AGE 40-64: CPT | Performed by: PHYSICIAN ASSISTANT

## 2021-06-10 PROCEDURE — 3078F DIAST BP <80 MM HG: CPT | Performed by: PHYSICIAN ASSISTANT

## 2021-06-10 PROCEDURE — 3008F BODY MASS INDEX DOCD: CPT | Performed by: PHYSICIAN ASSISTANT

## 2021-06-10 PROCEDURE — 3074F SYST BP LT 130 MM HG: CPT | Performed by: PHYSICIAN ASSISTANT

## 2021-06-10 RX ORDER — ESTRADIOL 4 UG/1
INSERT VAGINAL
COMMUNITY
Start: 2021-06-09

## 2021-06-10 RX ORDER — PREDNISONE 10 MG/1
TABLET ORAL
Qty: 52 TABLET | Refills: 0 | Status: CANCELLED | OUTPATIENT
Start: 2021-06-10

## 2021-06-10 RX ORDER — CLOBETASOL PROPIONATE 0.5 MG/G
CREAM TOPICAL
Qty: 30 G | Refills: 0 | Status: SHIPPED | OUTPATIENT
Start: 2021-06-10 | End: 2021-07-12

## 2021-06-10 NOTE — PATIENT INSTRUCTIONS
Please use [employer] or call Nnamdi Granados at [phone] to set up the following tests:  - fasting blood work -- do this after 7/20    Rash:  - stop triamcinolone cream  - start clobetasol cream - thin layer applied twice a day as need

## 2021-06-10 NOTE — PROGRESS NOTES
La Ever Hairston is a 61year old female who presents for a complete physical exam.   HPI:   Pt presents for a rash.   C/o red raised, itchy rash on ventral surface of R elbow (at antecubital) space which she first noticed about 5-6 days ago after clearing by mouth daily. • Calcium Carbonate-Vit D-Min (CALTRATE 600+D PLUS MINERALS OR) Take by mouth.         Past Medical History:   Diagnosis Date   • Allergic rhinitis    • Anemia, unspecified    • Bereavement, uncomplicated 49/01/[birth year]20   • Esophageal reflux WILLIAMSON, palpitations  GI: denies abdominal pain, nausea, vomiting, diarrhea, constipation, melena, BRBPR, heartburn  : denies dysuria, hematuria  MUSCULOSKELETAL: denies joint redness or swelling  NEURO: denies headaches, dizziness, LH  BREAST: no pain, dis 10-14 days. Rec OTC antihistamine such as Claritin in AM and can take Benadryl in PM.  Cool compresses prn. # Onychomycosis: referred to derm. Health Maint (CPX on 6/10/21): # Breast CA screening: does yearly MMG through GYN.   # Cervical CA screening

## 2021-06-16 ENCOUNTER — TELEPHONE (OUTPATIENT)
Dept: INTERNAL MEDICINE CLINIC | Facility: CLINIC | Age: 61
End: 2021-06-16

## 2021-06-16 NOTE — TELEPHONE ENCOUNTER
Pt is requesting a MRI for her shoulder. Pt states she thinks she torn her labrum again. She tore the same one in 2012. Pt also wants to speak to a nurse regarding left knee and left hip pain. Pt states that they are sore and painful.      Confirmed [phone]

## 2021-06-16 NOTE — TELEPHONE ENCOUNTER
Pt states that in 2012 she tore R side labrum. She is currently experiencing same type of pain to L hip/knee. Rates pain 6/10, pain with sitting, standing, bending and walking. Denies injury but did state she does intense labor daily in yard.  Denies sw

## 2021-06-17 ENCOUNTER — HOSPITAL ENCOUNTER (OUTPATIENT)
Dept: MAMMOGRAPHY | Age: 61
Discharge: HOME | End: 2021-06-17
Attending: OBSTETRICS & GYNECOLOGY

## 2021-06-17 DIAGNOSIS — Z12.31 ENCOUNTER FOR SCREENING MAMMOGRAM FOR MALIGNANT NEOPLASM OF BREAST: ICD-10-CM

## 2021-06-17 PROCEDURE — 77067 SCR MAMMO BI INCL CAD: CPT

## 2021-06-18 ENCOUNTER — TELEPHONE (OUTPATIENT)
Dept: INTERNAL MEDICINE CLINIC | Facility: CLINIC | Age: 61
End: 2021-06-18

## 2021-06-18 NOTE — TELEPHONE ENCOUNTER
Incoming results for mammogram screening 6/17/21. Impression:   There is no mammographic evidence of malignancy. A 1 year screening mammogram is recommended. Patient has heterogeneously/extrenly dense breast tissues and elevated lifetime risk of breast cancer according to the [address] risk assessment model. Consider genetic screening if applicable and/or adjunct screening breast ultrasound or MRI as clinically indicated. Placed in your inbasket.

## 2021-06-21 ENCOUNTER — LAB ENCOUNTER (OUTPATIENT)
Dept: LAB | Age: 61
End: 2021-06-21
Attending: INTERNAL MEDICINE
Payer: COMMERCIAL

## 2021-06-21 DIAGNOSIS — Z01.818 PRE-OP TESTING: ICD-10-CM

## 2021-06-24 PROBLEM — R10.13 DYSPEPSIA: Status: ACTIVE | Noted: 2021-06-24

## 2021-06-24 PROBLEM — Z12.11 SPECIAL SCREENING FOR MALIGNANT NEOPLASMS, COLON: Status: ACTIVE | Noted: 2021-06-24

## 2021-06-24 PROBLEM — R14.1 GAS PAIN: Status: ACTIVE | Noted: 2021-06-24

## 2021-07-01 ENCOUNTER — OFFICE VISIT (OUTPATIENT)
Dept: PODIATRY CLINIC | Facility: CLINIC | Age: 61
End: 2021-07-01
Payer: COMMERCIAL

## 2021-07-01 DIAGNOSIS — B35.1 ONYCHOMYCOSIS: Primary | ICD-10-CM

## 2021-07-01 PROCEDURE — 99202 OFFICE O/P NEW SF 15 MIN: CPT | Performed by: PODIATRIST

## 2021-07-01 NOTE — PROGRESS NOTES
Marietta Saxena Daily is a 61year old female.  Patient presents with:  Consult: Possible fungus on R foot 3rd toe, pain comes and goes        HPI:   This pleasant patient presents to the clinic with a chief complaint of toenail fungus on the fourth toe of her r (Patient not taking: Reported on 7/1/2021 ) 60 tablet 2   • clotrimazole-betamethasone 1-0.05 % External Cream Apply thin layer to AA BID x 2-4 weeks then an additional week once lesion has cleared.  (Patient not taking: Reported on 7/1/2021 ) 15 g 0   • hy Hypertension Brother    • Other (Other) Brother         ETOH, drug abuse   • Alcohol and Other Disorders Associated Brother         alcohol   • Heart Attack Brother    • Hypertension Mother    • Thyroid Disorder Mother    • Lipids Mother    • Other (Other) this visit. GENERAL: well developed, well nourished, in no apparent distress  EXTREMITIES:   1.  Integument: The skin on her right foot was evaluated its warm dry and supple her toenails 1-3 and the fifth are painted I could not assess coloration however t

## 2021-07-12 ENCOUNTER — TELEPHONE (OUTPATIENT)
Dept: INTERNAL MEDICINE CLINIC | Facility: CLINIC | Age: 61
End: 2021-07-12

## 2021-07-12 RX ORDER — CLOBETASOL PROPIONATE 0.5 MG/G
CREAM TOPICAL
Qty: 30 G | Refills: 0 | Status: SHIPPED | OUTPATIENT
Start: 2021-07-12

## 2021-07-12 NOTE — TELEPHONE ENCOUNTER
I called pt to provide info below. Pt states her call was to inquire if she is in need of an oral steroid as the red rash went away after 3 days of using ointment as instructed at last OV, but returned approximately 3-4 days ago.      Pt does not believ

## 2021-07-12 NOTE — TELEPHONE ENCOUNTER
If rash previously improved with topical steroid then recommend resuming this at this time. I do not recommend using oral steroids unless absolutely necessary d/t possible SEs.

## 2021-07-12 NOTE — TELEPHONE ENCOUNTER
Are you willing to send RX or provide referral to derm? OV notes from 6/10/21  # Contact dermatitis: c/w poison ivy/oak/sumac. Stop triamcinolone cream and start clobetasol 0.05% cream - thin layer to AA BID PRN x 10-14 days.   Rec OTC antihistamine suc

## 2021-07-12 NOTE — TELEPHONE ENCOUNTER
Pt seen on 6/10 with LL regarding rash. Patient stated rash has returned on her face and arms. Patient requesting for another steroid.      [id number] [place], [address] [facility] [address]  [address] [facility], [phone], [phone]

## 2021-07-12 NOTE — TELEPHONE ENCOUNTER
Refill of steroid cream sent to pharmacy. If not improving in the next few days rec f/u with GYN. Rec wearing long sleeves / pants when doing yard work / gardening.

## 2021-07-14 ENCOUNTER — TELEPHONE (OUTPATIENT)
Dept: ORTHOPEDICS CLINIC | Facility: CLINIC | Age: 61
End: 2021-07-14

## 2021-07-14 DIAGNOSIS — B35.1 ONYCHOMYCOSIS: Primary | ICD-10-CM

## 2021-07-14 NOTE — TELEPHONE ENCOUNTER
----- Message from True Lesch, DPM sent at 7/7/2021  3:28 PM CDT -----  Results reviewed. Please inform patient that fungal culture results are positive and we should proceed with Lamisil tablet therapy.   If the patient agrees we have to do a hepat

## 2021-07-14 NOTE — TELEPHONE ENCOUNTER
No contraindications that I know of I think the medication is all okay but she should check with your heart specialist.

## 2021-07-14 NOTE — TELEPHONE ENCOUNTER
[employer] message sent to patient and message left to call with questions or concerns.

## 2021-07-26 ENCOUNTER — LAB ENCOUNTER (OUTPATIENT)
Dept: LAB | Age: 61
End: 2021-07-26
Attending: PODIATRIST
Payer: COMMERCIAL

## 2021-07-26 DIAGNOSIS — B35.1 ONYCHOMYCOSIS: ICD-10-CM

## 2021-07-26 LAB
ALBUMIN SERPL-MCNC: 3.8 G/DL (ref 3.4–5)
ALP LIVER SERPL-CCNC: 55 U/L
ALT SERPL-CCNC: 25 U/L
AST SERPL-CCNC: 23 U/L (ref 15–37)
BILIRUB DIRECT SERPL-MCNC: 0.1 MG/DL (ref 0–0.2)
BILIRUB SERPL-MCNC: 0.7 MG/DL (ref 0.1–2)
M PROTEIN MFR SERPL ELPH: 7.5 G/DL (ref 6.4–8.2)

## 2021-07-26 PROCEDURE — 36415 COLL VENOUS BLD VENIPUNCTURE: CPT

## 2021-07-26 PROCEDURE — 80076 HEPATIC FUNCTION PANEL: CPT

## 2021-08-05 ENCOUNTER — TELEPHONE (OUTPATIENT)
Dept: PODIATRY CLINIC | Facility: CLINIC | Age: 61
End: 2021-08-05

## 2021-08-05 NOTE — TELEPHONE ENCOUNTER
----- Message from Anjana Wright DPM sent at 7/28/2021  4:38 PM CDT -----  Hepatic function panel is normal please call in the following prescription:   Lamisil 250 mg tablets  Dispense 45, no refill  Instructions take 1 tablet daily p.o.   Then please

## 2021-08-09 RX ORDER — TERBINAFINE HYDROCHLORIDE 250 MG/1
250 TABLET ORAL DAILY
Qty: 45 TABLET | Refills: 0 | Status: SHIPPED | OUTPATIENT
Start: 2021-08-09 | End: 2021-09-20

## 2021-08-09 NOTE — TELEPHONE ENCOUNTER
Spoke to patient and relayed Dr. Ovidio Sarmiento message as shown below. Patient verbalized understanding of whole message and of rx route, dose, and frequency. rx sent to pharmacy. No further action required at this time.

## 2021-09-02 ENCOUNTER — PATIENT MESSAGE (OUTPATIENT)
Dept: PODIATRY CLINIC | Facility: CLINIC | Age: 61
End: 2021-09-02

## 2021-09-02 DIAGNOSIS — B35.1 ONYCHOMYCOSIS: Primary | ICD-10-CM

## 2021-09-13 NOTE — TELEPHONE ENCOUNTER
Per pt asking for hepatic function order, pt finishes medication on 9/22, and has appt on 9/23, asking when is she supposed to do lab? Please advise thank you.

## 2021-09-13 NOTE — TELEPHONE ENCOUNTER
Please let the patient know I have written for her hepatic function panel once we get the results we will refill the prescription if it is okay

## 2021-09-14 ENCOUNTER — APPOINTMENT (OUTPATIENT)
Dept: GENERAL RADIOLOGY | Age: 61
End: 2021-09-14
Attending: PHYSICIAN ASSISTANT
Payer: COMMERCIAL

## 2021-09-14 ENCOUNTER — TELEPHONE (OUTPATIENT)
Dept: INTERNAL MEDICINE CLINIC | Facility: CLINIC | Age: 61
End: 2021-09-14

## 2021-09-14 ENCOUNTER — HOSPITAL ENCOUNTER (OUTPATIENT)
Age: 61
Discharge: ANOTHER HOSPITAL | End: 2021-09-14
Payer: COMMERCIAL

## 2021-09-14 ENCOUNTER — APPOINTMENT (OUTPATIENT)
Dept: CT IMAGING | Facility: HOSPITAL | Age: 61
End: 2021-09-14
Attending: EMERGENCY MEDICINE
Payer: COMMERCIAL

## 2021-09-14 ENCOUNTER — APPOINTMENT (OUTPATIENT)
Dept: CT IMAGING | Age: 61
End: 2021-09-14
Attending: PHYSICIAN ASSISTANT
Payer: COMMERCIAL

## 2021-09-14 ENCOUNTER — HOSPITAL ENCOUNTER (EMERGENCY)
Facility: HOSPITAL | Age: 61
Discharge: HOME | End: 2021-09-14
Attending: EMERGENCY MEDICINE
Payer: COMMERCIAL

## 2021-09-14 VITALS
SYSTOLIC BLOOD PRESSURE: 142 MMHG | HEART RATE: 90 BPM | RESPIRATION RATE: 18 BRPM | BODY MASS INDEX: 23 KG/M2 | OXYGEN SATURATION: 97 % | TEMPERATURE: 98 F | WEIGHT: 150 LBS | DIASTOLIC BLOOD PRESSURE: 89 MMHG

## 2021-09-14 VITALS
TEMPERATURE: 99 F | OXYGEN SATURATION: 95 % | DIASTOLIC BLOOD PRESSURE: 86 MMHG | HEART RATE: 82 BPM | HEIGHT: 68 IN | BODY MASS INDEX: 22.73 KG/M2 | SYSTOLIC BLOOD PRESSURE: 143 MMHG | RESPIRATION RATE: 16 BRPM | WEIGHT: 150 LBS

## 2021-09-14 DIAGNOSIS — R55 SYNCOPE AND COLLAPSE: Primary | ICD-10-CM

## 2021-09-14 DIAGNOSIS — R79.89 POSITIVE D DIMER: ICD-10-CM

## 2021-09-14 DIAGNOSIS — R51.9 ACUTE NONINTRACTABLE HEADACHE, UNSPECIFIED HEADACHE TYPE: ICD-10-CM

## 2021-09-14 DIAGNOSIS — R53.83 FATIGUE, UNSPECIFIED TYPE: ICD-10-CM

## 2021-09-14 DIAGNOSIS — R07.89 CHEST WALL PAIN: ICD-10-CM

## 2021-09-14 DIAGNOSIS — S09.90XA INJURY OF HEAD, INITIAL ENCOUNTER: ICD-10-CM

## 2021-09-14 LAB
#MXD IC: 0.4 X10ˆ3/UL (ref 0.1–1)
ALBUMIN SERPL-MCNC: 3.7 G/DL (ref 3.4–5)
ALP LIVER SERPL-CCNC: 57 U/L
ALT SERPL-CCNC: 19 U/L
AST SERPL-CCNC: 13 U/L (ref 15–37)
BILIRUB DIRECT SERPL-MCNC: 0.2 MG/DL (ref 0–0.2)
BILIRUB SERPL-MCNC: 0.6 MG/DL (ref 0.1–2)
DDIMER WHOLE BLOOD: 603 NG/ML DDU (ref ?–400)
HCT VFR BLD AUTO: 43 %
HGB BLD-MCNC: 13.7 G/DL
LYMPHOCYTES # BLD AUTO: 0.6 X10ˆ3/UL (ref 1–4)
LYMPHOCYTES NFR BLD AUTO: 17.9 %
MCH RBC QN AUTO: 27.8 PG (ref 26–34)
MCHC RBC AUTO-ENTMCNC: 31.9 G/DL (ref 31–37)
MCV RBC AUTO: 87.4 FL (ref 80–100)
MIXED CELL %: 11.4 %
NEUTROPHILS # BLD AUTO: 2.5 X10ˆ3/UL (ref 1.5–7.7)
NEUTROPHILS NFR BLD AUTO: 70.7 %
PLATELET # BLD AUTO: 133 X10ˆ3/UL (ref 150–450)
POCT BILIRUBIN URINE: NEGATIVE
POCT GLUCOSE URINE: NEGATIVE MG/DL
POCT KETONE URINE: NEGATIVE MG/DL
POCT LEUKOCYTE ESTERASE URINE: NEGATIVE
POCT NITRITE URINE: NEGATIVE
POCT PH URINE: 6 (ref 5–8)
POCT PROTEIN URINE: NEGATIVE MG/DL
POCT SPECIFIC GRAVITY URINE: 1.01
POCT URINE CLARITY: CLEAR
POCT URINE COLOR: YELLOW
POCT UROBILINOGEN URINE: 0.2 MG/DL
PROT SERPL-MCNC: 7.5 G/DL (ref 6.4–8.2)
RBC # BLD AUTO: 4.92 X10ˆ6/UL
SARS-COV-2 RNA RESP QL NAA+PROBE: NOT DETECTED
TROPONIN I BLD-MCNC: <0.02 NG/ML
WBC # BLD AUTO: 3.5 X10ˆ3/UL (ref 4–11)

## 2021-09-14 PROCEDURE — 71046 X-RAY EXAM CHEST 2 VIEWS: CPT | Performed by: PHYSICIAN ASSISTANT

## 2021-09-14 PROCEDURE — 96361 HYDRATE IV INFUSION ADD-ON: CPT

## 2021-09-14 PROCEDURE — 36415 COLL VENOUS BLD VENIPUNCTURE: CPT

## 2021-09-14 PROCEDURE — 99285 EMERGENCY DEPT VISIT HI MDM: CPT

## 2021-09-14 PROCEDURE — 85025 COMPLETE CBC W/AUTO DIFF WBC: CPT | Performed by: PHYSICIAN ASSISTANT

## 2021-09-14 PROCEDURE — 84484 ASSAY OF TROPONIN QUANT: CPT

## 2021-09-14 PROCEDURE — 81002 URINALYSIS NONAUTO W/O SCOPE: CPT | Performed by: PHYSICIAN ASSISTANT

## 2021-09-14 PROCEDURE — 70450 CT HEAD/BRAIN W/O DYE: CPT | Performed by: PHYSICIAN ASSISTANT

## 2021-09-14 PROCEDURE — 96360 HYDRATION IV INFUSION INIT: CPT

## 2021-09-14 PROCEDURE — 93010 ELECTROCARDIOGRAM REPORT: CPT

## 2021-09-14 PROCEDURE — 85378 FIBRIN DEGRADE SEMIQUANT: CPT | Performed by: PHYSICIAN ASSISTANT

## 2021-09-14 PROCEDURE — 99284 EMERGENCY DEPT VISIT MOD MDM: CPT

## 2021-09-14 PROCEDURE — 99215 OFFICE O/P EST HI 40 MIN: CPT

## 2021-09-14 PROCEDURE — 93005 ELECTROCARDIOGRAM TRACING: CPT

## 2021-09-14 PROCEDURE — 80076 HEPATIC FUNCTION PANEL: CPT | Performed by: EMERGENCY MEDICINE

## 2021-09-14 PROCEDURE — 71260 CT THORAX DX C+: CPT | Performed by: EMERGENCY MEDICINE

## 2021-09-14 RX ORDER — ONDANSETRON 2 MG/ML
4 INJECTION INTRAMUSCULAR; INTRAVENOUS ONCE
Status: DISCONTINUED | OUTPATIENT
Start: 2021-09-14 | End: 2021-09-14

## 2021-09-14 NOTE — ED INITIAL ASSESSMENT (HPI)
Patient presents from immediate care for further evaluation of elevated d-dimer. Patient went to immediate care because she had a headache and syncopal event yesterday.  She reported to them some chest wall pain; had an EKG which is normal. Patient reports

## 2021-09-14 NOTE — ED PROVIDER NOTES
Patient Seen in: [facility]      History   Patient presents with:  Syncope    Stated Complaint: nausea / syncope / chills    Subjective:   HPI    59-year-old female who comes in today with multiple complaints.   Yesterday she states she wo 20/30's   • Phlebitis and thrombophlebitis of superficial vessels of lower extremities 06/30/2011   • Presence of other cardiac implants and grafts     marker in right breast   • Scleritis, unspecified 06/30/2011   • Unspecified adverse effect of unspecifi Supple; symmetrical, trachea midline, no adenopathy  Lungs:  Clear to auscultation bilaterally, respirations unlabored, no wheezing, rales or rhonchi   Heart:  Regular rate & rhythm, S1 and S2 normal, no murmurs, rubs, or gallops  Chest wall: ttp over righ vascularity. There is minimal biapical pleural and parenchymal scarring. CARDIAC:  Heart size is within normal limits. No pulmonary edema. MEDIASTINUM:  Normal. PLEURA:  Normal.  No pleural effusions. BONES:  Normal for age.             CONCLUSION:  Hyper Donya Chavez who agrees with my assessment and plan. 70-year-old female who comes in after sustaining a syncopal episode yesterday late morning with loss of consciousness and facial contusion.   Patient states that for the rest of the day she experienced s

## 2021-09-14 NOTE — TELEPHONE ENCOUNTER
Pt stated that she has had body aches, chills and fatigue. Pt also stated she has been having chest pain under her breast since the symptoms have started.      Pt scheduled video visit for 9/15 -     Future Appointments   Date Time Provider Department Sandra

## 2021-09-14 NOTE — TELEPHONE ENCOUNTER
Pt states that since Monday she has increased fatigue, HA, body aches, lack of appetite, chills, nausea, cough, chest congestion, R side chest pain-worse with coughing/inhalation and light headed.  Pt states that she was working in her yard yesterday, began

## 2021-09-15 NOTE — ED PROVIDER NOTES
Patient Seen in: [facility] Emergency Department      History   Patient presents with:  Abnormal Result    Stated Complaint: sent from ic, +d dimer     Subjective:   HPI    This is a 60-year-old woman, no significant past medical history, here for e Arthritis     old age   • Back pain     whenever I work too much in the yard   • Belching    • Bereavement, uncomplicated 28/77/2202   • Bloating    • Esophageal reflux    • Flatulence/gas pain/belching    • Heart palpitations 1981   • Heavy menses     whe BMI 22.81 kg/m²         Physical Exam        Physical Exam  Vitals signs and nursing note reviewed. General: Well-appearing middle-aged woman in no acute distress  Head: Normocephalic and atraumatic.    HEENT:  Mucous membranes are moist.   Cardiovascul 9/14/2021  PROCEDURE:  CT BRAIN OR HEAD (73415)  COMPARISON:  [facility] , CT, CT BRAIN OR HEAD (40128), 2/09/2016, 1:16 PM.  INDICATIONS:  nausea / syncope / chills  TECHNIQUE:  Noncontrast CT scanning is performed through the brain.  Dose reduction techniques Patient complains of right to mid chest pains and a high D-dimer. CONTRAST USED:  100cc of Omnipaque 350  FINDINGS:  LUNGS:  No focal consolidation. 5-6 mm left lower lobe calcified granuloma.   Left lower lobe linear density is most consistent with atel [address]  [address] [facility]  [phone]    As needed, If symptoms worsen          Medications Prescribed:  Discharge Medication List as of 9/14/2021 11:17 PM

## 2021-09-17 ENCOUNTER — TELEPHONE (OUTPATIENT)
Dept: INTERNAL MEDICINE CLINIC | Facility: CLINIC | Age: 61
End: 2021-09-17

## 2021-09-17 LAB
ATRIAL RATE: 83 BPM
P AXIS: 44 DEGREES
P-R INTERVAL: 168 MS
Q-T INTERVAL: 356 MS
QRS DURATION: 94 MS
QTC CALCULATION (BEZET): 418 MS
R AXIS: 23 DEGREES
T AXIS: 6 DEGREES
VENTRICULAR RATE: 83 BPM

## 2021-09-17 NOTE — TELEPHONE ENCOUNTER
Spoke with patient advised per RP okay to start with [facility]/Dr. Molina, if adjustments do not help her headache/pain she can follow up with our office, she is advised to return to ER if develops new neurological symptoms (blurred vision, severe weakness in

## 2021-09-17 NOTE — TELEPHONE ENCOUNTER
Ok to start with chiropractor/Dr. Parvin Gadrner. If adjustments do not help her headache/pain she can follow up with us in the office.   She should return to the emergency department if she develops new neurological symptoms (blurred vision, severe weakness in a

## 2021-09-17 NOTE — TELEPHONE ENCOUNTER
Spoke with patient stating fell Monday and was sent to ER on Tuesday by our office, patient was seen at [facility] ER on 09/14/2021, had imaging done-everything came back normal. Patient stating this has happened in the past and has seen Dr. Luis Claire for

## 2021-09-17 NOTE — TELEPHONE ENCOUNTER
Pt states that she still has throbbing headache from when she fell on Tuesday. Pt wants to know if she needs to see her chiropractor since the last time this happened they were able to help.  Pt stated that she wasn't sure if there was anything else that we

## 2021-09-20 ENCOUNTER — OFFICE VISIT (OUTPATIENT)
Dept: INTERNAL MEDICINE CLINIC | Facility: CLINIC | Age: 61
End: 2021-09-20
Payer: COMMERCIAL

## 2021-09-20 ENCOUNTER — LAB ENCOUNTER (OUTPATIENT)
Dept: LAB | Age: 61
End: 2021-09-20
Attending: PODIATRIST
Payer: COMMERCIAL

## 2021-09-20 VITALS
BODY MASS INDEX: 23.45 KG/M2 | HEIGHT: 67 IN | WEIGHT: 149.38 LBS | HEART RATE: 70 BPM | DIASTOLIC BLOOD PRESSURE: 74 MMHG | SYSTOLIC BLOOD PRESSURE: 124 MMHG | OXYGEN SATURATION: 98 % | TEMPERATURE: 99 F | RESPIRATION RATE: 12 BRPM

## 2021-09-20 DIAGNOSIS — I34.0 NON-RHEUMATIC MITRAL REGURGITATION: ICD-10-CM

## 2021-09-20 DIAGNOSIS — B35.1 ONYCHOMYCOSIS: ICD-10-CM

## 2021-09-20 DIAGNOSIS — I34.1 MITRAL VALVE PROLAPSE: ICD-10-CM

## 2021-09-20 DIAGNOSIS — I49.3 PVC (PREMATURE VENTRICULAR CONTRACTION): ICD-10-CM

## 2021-09-20 DIAGNOSIS — R91.8 NONSPECIFIC ABNORMAL FINDINGS ON IMAGING OF LUNG: ICD-10-CM

## 2021-09-20 DIAGNOSIS — E78.00 HYPERCHOLESTEROLEMIA: ICD-10-CM

## 2021-09-20 DIAGNOSIS — R55 SYNCOPE AND COLLAPSE: Primary | ICD-10-CM

## 2021-09-20 DIAGNOSIS — R21 RASH AND NONSPECIFIC SKIN ERUPTION: ICD-10-CM

## 2021-09-20 PROBLEM — F51.01 PRIMARY INSOMNIA: Chronic | Status: ACTIVE | Noted: 2020-07-23

## 2021-09-20 PROBLEM — F41.1 GAD (GENERALIZED ANXIETY DISORDER): Chronic | Status: ACTIVE | Noted: 2020-07-23

## 2021-09-20 LAB
ALBUMIN SERPL-MCNC: 3.4 G/DL (ref 3.4–5)
ALP LIVER SERPL-CCNC: 71 U/L
ALT SERPL-CCNC: 18 U/L
AST SERPL-CCNC: 14 U/L (ref 15–37)
BILIRUB DIRECT SERPL-MCNC: 0.1 MG/DL (ref 0–0.2)
BILIRUB SERPL-MCNC: 0.6 MG/DL (ref 0.1–2)
PROT SERPL-MCNC: 7.7 G/DL (ref 6.4–8.2)

## 2021-09-20 PROCEDURE — 80076 HEPATIC FUNCTION PANEL: CPT

## 2021-09-20 PROCEDURE — 36415 COLL VENOUS BLD VENIPUNCTURE: CPT

## 2021-09-20 PROCEDURE — 99214 OFFICE O/P EST MOD 30 MIN: CPT | Performed by: INTERNAL MEDICINE

## 2021-09-20 PROCEDURE — 3008F BODY MASS INDEX DOCD: CPT | Performed by: INTERNAL MEDICINE

## 2021-09-20 PROCEDURE — 3078F DIAST BP <80 MM HG: CPT | Performed by: INTERNAL MEDICINE

## 2021-09-20 PROCEDURE — 3074F SYST BP LT 130 MM HG: CPT | Performed by: INTERNAL MEDICINE

## 2021-09-20 NOTE — PROGRESS NOTES
Juanjose Dawkins Daily is a 61year old female. HPI:   Patient presents to discuss several issues. 1 week ago she awoke with a headache, significant fatigue. Both of these symptoms are rare for her.   She took her dog for a 2 mile walk, then came back and [address] to AA BID PRN for up to 10-14 days, Disp: 30 g, Rfl: 0  •  IMVEXXY MAINTENANCE PACK 4 MCG Vaginal Insert, , Disp: , Rfl:   •  ALPRAZolam 0.25 MG Oral Tab, Take 1 tablet (0.25 mg total) by mouth 2 (two) times daily as needed for Anxiety. , Disp: 60 tablet, R (11/01/2011), Painful urination, Phlebitis and thrombophlebitis of superficial vessels of lower extremities (06/30/2011), Presence of other cardiac implants and grafts, Scleritis, unspecified (06/30/2011), Unspecified adverse effect of unspecified drug, me throughout  NEURO: CN II-XII intact, 5/5 strength all extremities  MS: Full ROM, no joint pain  PSYCH: pleasant, appropriate mood and affect  ASSESSMENT AND PLAN:   1. Syncope and collapse  Patient with syncopal episode for several seconds.   Had presyncopa (Cardiovascular Diseases)  Ruddy Small as Consulting Physician (CHIROPRACTOR)  The patient indicates understanding of these issues and agrees to the plan.   The patient is asked to return to clinic in June 2022 for follow up on chronic issues/CPX, or ear

## 2021-09-20 NOTE — PATIENT INSTRUCTIONS
- Hold terbinafine for now  - Make sure to continue to stay well hydrated  - If your rash/red areas are not better by Thursday morning, start using your old steroid cream, triamcinolone, twice daily on the red areas  - You can get Amy's blood work done

## 2021-09-21 PROBLEM — R91.8: Status: ACTIVE | Noted: 2021-09-21

## 2021-09-23 ENCOUNTER — OFFICE VISIT (OUTPATIENT)
Dept: PODIATRY CLINIC | Facility: CLINIC | Age: 61
End: 2021-09-23
Payer: COMMERCIAL

## 2021-09-23 DIAGNOSIS — B35.1 ONYCHOMYCOSIS: Primary | ICD-10-CM

## 2021-09-23 PROCEDURE — 99213 OFFICE O/P EST LOW 20 MIN: CPT | Performed by: PODIATRIST

## 2021-09-23 NOTE — PROGRESS NOTES
Kalee Hairston is a 61year old female.  Patient presents with:  Toenail Fungus: Right 3rd toe f/u - she stopped the lamisil last week and she was a side effect from it which landed her in the ER with syncopee        HPI:   Patient returns to the clinic s OR) Take by mouth. Past Medical History:   Diagnosis Date   • Abdominal hernia     when I was a teenager   • Abdominal pain 2019    sludge in my gall bladder?    • Allergic rhinitis    • Anemia, unspecified    • Arthritis     old age   • Back pain Grandmother         CHF   • Mental Disorder Maternal Grandmother         schizophrenia   • Other (Other) Brother         ETOH, drug abuse   • Alcohol and Other Disorders Associated Sister         alcohol   • Alcohol and Other Disorders Associated Sister AND PLAN:   Diagnoses and all orders for this visit:    Onychomycosis        Plan: I told her to continue to use Lotrimin cream around the area just one application daily we will see her again in follow-up in about three or 4 months.     The patient ania negative

## 2021-09-29 ENCOUNTER — TELEPHONE (OUTPATIENT)
Dept: INTERNAL MEDICINE CLINIC | Facility: CLINIC | Age: 61
End: 2021-09-29

## 2021-09-29 DIAGNOSIS — R21 RASH AND NONSPECIFIC SKIN ERUPTION: Primary | ICD-10-CM

## 2021-09-29 RX ORDER — PREDNISONE 10 MG/1
TABLET ORAL
Qty: 18 TABLET | Refills: 0 | Status: SHIPPED | OUTPATIENT
Start: 2021-09-29

## 2021-09-29 NOTE — TELEPHONE ENCOUNTER
I would recommend oral steroids (prednisone) - will send in prescription. If this does not help would recommend follow up with Dermatology, I believe she sees Dr. Shaneka Murphy.

## 2021-09-29 NOTE — TELEPHONE ENCOUNTER
I believe she had some old/leftover triamcinolone cream that she was going to use - has she been using this cream already? If she has not started/resumed triamcinolone cream I can send in a new prescription.

## 2021-09-29 NOTE — TELEPHONE ENCOUNTER
Patient was seen on 9/23/2021 for rash. Per OV notes, \"2. Rash and nonspecific skin eruption  Scattered macular lesions on legs, blotchy red lesions on chest.  Would expect more diffuse rash with drug eruption.   Monitor for now - if lesions persist will h

## 2021-09-29 NOTE — TELEPHONE ENCOUNTER
Pt called stating she's been having a rash that does not go away. Pt states it keeps spreading and growing. She states its all over her body, looks blotchy and red.  Pt does apply cream which will make it slightly better but the rash comes back and spreads

## 2021-10-06 ENCOUNTER — TELEPHONE (OUTPATIENT)
Dept: INTERNAL MEDICINE CLINIC | Facility: CLINIC | Age: 61
End: 2021-10-06

## 2021-10-06 NOTE — TELEPHONE ENCOUNTER
Spoke with patient who has been taking prednisone d/t rash. In the last few days patient has begin to experience flushing of the faces with itching. She has also has been experiencing bilateral elbow pain (worse on the left) with some redness and swelling. Patient states that she experienced these same sx when taking xanax (listed as an allergy). Patient has one dose of prednisone left and states she will not be taking it. Rash has improved but it not completely gone. Denies SOB, difficulty breathing, difficulty swallowing, swelling of lips/tongue/face. Advised patient to discontinue use of prednisone and, per Dr. Brittni Martinez previous recommendations, to follow-up with dermatology regarding rash. Patient states she does not want to follow-up with anyone regarding rash as she does not want to be prescribed anything else because she does not do well with medications. Encouraged patient to continue to monitor rash and sx and update our office if anything changes. Advised patient to proceed to UC/ER for any worsening sx, SOB, difficulty breathing, and swelling of lips/tongue/face. Patient verbalized understanding.

## 2021-10-25 ENCOUNTER — TELEPHONE (OUTPATIENT)
Dept: INTERNAL MEDICINE CLINIC | Facility: CLINIC | Age: 61
End: 2021-10-25

## 2021-10-25 NOTE — TELEPHONE ENCOUNTER
Pt complaining of dry, non-productive cough and chest congestion  Onset mid-Sept  Previously discontinued prednisone d/t allergic reaction  Tried OTC Claritin D with no relief   Denies SOB, Difficulty breathing, lip/tongue/facial swelling, nasal congestion/drainage, fever  Pt was advised by chiro she might need abs for cough (sees chiro following fall Mid-Sept)    Chest XR & CT 9/14/21    Upcoming appt with Dr. Tennille Beth, pulmonology 10/27/21 at 1:00 p.m. Schedule OV with Dr. Adarsh Silver ? Or fol up after pulm visit ?

## 2021-10-25 NOTE — TELEPHONE ENCOUNTER
Reached out to patient and she stated she doesn't see a point in seeing Dr Audra Lemon if she is seeing pulmonology. Patient stated she will call back later to schedule.     REYNA

## 2022-01-11 ENCOUNTER — TELEPHONE (OUTPATIENT)
Dept: INTERNAL MEDICINE CLINIC | Facility: CLINIC | Age: 62
End: 2022-01-11

## 2022-01-11 NOTE — TELEPHONE ENCOUNTER
Pt wanted to know if [facility] sent over her immunization record that she got the flu shot on 11/18/2021. Per pt she got the flu shot at the [facility] at [address] [facility].  Pt wants to know if we can get records from [facility] so we can update them h

## 2022-02-17 ENCOUNTER — TELEPHONE (OUTPATIENT)
Dept: INTERNAL MEDICINE CLINIC | Facility: CLINIC | Age: 62
End: 2022-02-17

## 2022-02-17 NOTE — TELEPHONE ENCOUNTER
Future Appointments   Date Time Provider Shilo Dacosta   2/28/2022  1:00 PM DORA Brown [facility] [place]     Pt needs cpx and tb test for work. Please place orders.

## 2022-02-18 NOTE — TELEPHONE ENCOUNTER
Pt called back, pt does not know which one, she states all it says is she needs a tb test and physical. Asked pt to clarify which test she needs from her employer. Pt states IF she asks she will notify us.

## 2022-02-18 NOTE — TELEPHONE ENCOUNTER
Labs pended. Please clarify if pt needs quantiferon gold TB test (pended) or PPD (can do at time of visit) then place orders.

## 2022-02-21 NOTE — TELEPHONE ENCOUNTER
Pt called back stating her employer states she can get either test. Pt states she does not care and will do whichever one [facility] prefers for her and that requires the least amount of back and fourth with the office.

## 2022-02-21 NOTE — TELEPHONE ENCOUNTER
Lab orders placed including quantiferon gold. Please notify pt this test can take several days to come back. rec doing labs asap.

## 2022-02-21 NOTE — TELEPHONE ENCOUNTER
Pt informed of fasting lab orders - will complete as soon as possible. Cancelled patients appointment for a cpx that was scheduled, she is not due. Pt will drop off form for completion.

## 2022-03-17 ENCOUNTER — LAB ENCOUNTER (OUTPATIENT)
Dept: LAB | Age: 62
End: 2022-03-17
Attending: PHYSICIAN ASSISTANT
Payer: COMMERCIAL

## 2022-03-17 ENCOUNTER — TELEPHONE (OUTPATIENT)
Dept: INTERNAL MEDICINE CLINIC | Facility: CLINIC | Age: 62
End: 2022-03-17

## 2022-03-17 DIAGNOSIS — Z00.00 ANNUAL PHYSICAL EXAM: ICD-10-CM

## 2022-03-17 DIAGNOSIS — Z20.822 EXPOSURE TO COVID-19 VIRUS: ICD-10-CM

## 2022-03-17 DIAGNOSIS — E78.00 HYPERCHOLESTEROLEMIA: ICD-10-CM

## 2022-03-17 DIAGNOSIS — Z00.00 LABORATORY EXAM ORDERED AS PART OF ROUTINE GENERAL MEDICAL EXAMINATION: ICD-10-CM

## 2022-03-17 DIAGNOSIS — Z11.1 SCREENING FOR TUBERCULOSIS: ICD-10-CM

## 2022-03-17 LAB
ANION GAP SERPL CALC-SCNC: 5 MMOL/L (ref 0–18)
BUN BLD-MCNC: 10 MG/DL (ref 7–18)
CALCIUM BLD-MCNC: 9.2 MG/DL (ref 8.5–10.1)
CHLORIDE SERPL-SCNC: 105 MMOL/L (ref 98–112)
CHOLEST SERPL-MCNC: 217 MG/DL (ref ?–200)
CO2 SERPL-SCNC: 30 MMOL/L (ref 21–32)
CREAT BLD-MCNC: 0.91 MG/DL
FASTING PATIENT LIPID ANSWER: YES
GLUCOSE BLD-MCNC: 95 MG/DL (ref 70–99)
HCV AB SERPL QL IA: NONREACTIVE
HDLC SERPL-MCNC: 62 MG/DL (ref 40–59)
LDLC SERPL CALC-MCNC: 144 MG/DL (ref ?–100)
NONHDLC SERPL-MCNC: 155 MG/DL (ref ?–130)
OSMOLALITY SERPL CALC.SUM OF ELEC: 289 MOSM/KG (ref 275–295)
POTASSIUM SERPL-SCNC: 3.6 MMOL/L (ref 3.5–5.1)
SARS-COV-2 IGG+IGM SERPL QL IA: REACTIVE
SODIUM SERPL-SCNC: 140 MMOL/L (ref 136–145)
TRIGL SERPL-MCNC: 65 MG/DL (ref 30–149)
VLDLC SERPL CALC-MCNC: 12 MG/DL (ref 0–30)

## 2022-03-17 PROCEDURE — 86803 HEPATITIS C AB TEST: CPT | Performed by: PHYSICIAN ASSISTANT

## 2022-03-17 PROCEDURE — 86480 TB TEST CELL IMMUN MEASURE: CPT | Performed by: PHYSICIAN ASSISTANT

## 2022-03-17 PROCEDURE — 80061 LIPID PANEL: CPT | Performed by: PHYSICIAN ASSISTANT

## 2022-03-17 PROCEDURE — 80048 BASIC METABOLIC PNL TOTAL CA: CPT | Performed by: PHYSICIAN ASSISTANT

## 2022-03-17 PROCEDURE — 86769 SARS-COV-2 COVID-19 ANTIBODY: CPT | Performed by: PHYSICIAN ASSISTANT

## 2022-03-17 NOTE — TELEPHONE ENCOUNTER
Pt walked into office stating that the lab took an extra sample in order to test for covid antibodies. Pt had labs drawn this morning. Pt is requesting an order to be placed by [facility]. Per pt, the lab told her that they would need to have the order in by 11am today since that is when the specimens get picked up. Pt is requesting a call back at [phone] once orders have been placed.

## 2022-03-21 LAB
M TB IFN-G CD4+ T-CELLS BLD-ACNC: 0.03 IU/ML
M TB TUBERC IFN-G BLD QL: NEGATIVE
M TB TUBERC IGNF/MITOGEN IGNF CONTROL: >10 IU/ML
QFT TB1 AG MINUS NIL: 0 IU/ML
QFT TB2 AG MINUS NIL: 0 IU/ML

## 2022-06-10 ENCOUNTER — TELEPHONE (OUTPATIENT)
Dept: INTERNAL MEDICINE CLINIC | Facility: CLINIC | Age: 62
End: 2022-06-10

## 2022-06-10 DIAGNOSIS — M25.511 CHRONIC PAIN OF BOTH SHOULDERS: Primary | ICD-10-CM

## 2022-06-10 DIAGNOSIS — G89.29 CHRONIC PAIN OF BOTH SHOULDERS: Primary | ICD-10-CM

## 2022-06-10 DIAGNOSIS — M75.111 NONTRAUMATIC INCOMPLETE BILATERAL ROTATOR CUFF TEAR: ICD-10-CM

## 2022-06-10 DIAGNOSIS — M25.512 CHRONIC PAIN OF BOTH SHOULDERS: Primary | ICD-10-CM

## 2022-06-10 DIAGNOSIS — M75.112 NONTRAUMATIC INCOMPLETE BILATERAL ROTATOR CUFF TEAR: ICD-10-CM

## 2022-06-10 NOTE — TELEPHONE ENCOUNTER
Pt requesting an US order for her rotating cuffs on both shoulders due to several tears based on chiropractor'Pablito results.

## 2022-06-10 NOTE — TELEPHONE ENCOUNTER
Pt requesting US both shoulders d/t rotator cuff tears based off US completed at Dr Federico Schneider office yesterday. Requesting Dr Sayda Hancock place orders so the imaging will appear in Epic and is viewable for her in [address] Pt declined contacting Dr Federico Schneider office requesting recent visit notes be faxed to Dr Sayda Hancock for review.

## 2022-06-13 NOTE — TELEPHONE ENCOUNTER
Patient calling to follow up on status. Informed order entered. Patient will call central scheduling to schedule.

## 2022-06-16 ENCOUNTER — HOSPITAL ENCOUNTER (OUTPATIENT)
Dept: ULTRASOUND IMAGING | Facility: HOSPITAL | Age: 62
Discharge: HOME | End: 2022-06-16
Attending: INTERNAL MEDICINE
Payer: COMMERCIAL

## 2022-06-16 DIAGNOSIS — M25.511 CHRONIC PAIN OF BOTH SHOULDERS: ICD-10-CM

## 2022-06-16 DIAGNOSIS — G89.29 CHRONIC PAIN OF BOTH SHOULDERS: ICD-10-CM

## 2022-06-16 DIAGNOSIS — M75.112 NONTRAUMATIC INCOMPLETE BILATERAL ROTATOR CUFF TEAR: ICD-10-CM

## 2022-06-16 DIAGNOSIS — M25.512 CHRONIC PAIN OF BOTH SHOULDERS: ICD-10-CM

## 2022-06-16 DIAGNOSIS — M75.111 NONTRAUMATIC INCOMPLETE BILATERAL ROTATOR CUFF TEAR: ICD-10-CM

## 2022-06-16 PROCEDURE — 76881 US COMPL JOINT R-T W/IMG: CPT | Performed by: INTERNAL MEDICINE

## 2022-06-23 DIAGNOSIS — M25.511 BILATERAL SHOULDER PAIN, UNSPECIFIED CHRONICITY: Primary | ICD-10-CM

## 2022-06-23 DIAGNOSIS — M25.512 BILATERAL SHOULDER PAIN, UNSPECIFIED CHRONICITY: Primary | ICD-10-CM

## 2022-06-27 ENCOUNTER — HOSPITAL ENCOUNTER (OUTPATIENT)
Dept: GENERAL RADIOLOGY | Age: 62
Discharge: HOME | End: 2022-06-27
Attending: ORTHOPAEDIC SURGERY
Payer: COMMERCIAL

## 2022-06-27 ENCOUNTER — TELEPHONE (OUTPATIENT)
Dept: ORTHOPEDICS CLINIC | Facility: CLINIC | Age: 62
End: 2022-06-27

## 2022-06-27 ENCOUNTER — OFFICE VISIT (OUTPATIENT)
Dept: ORTHOPEDICS CLINIC | Facility: CLINIC | Age: 62
End: 2022-06-27
Payer: COMMERCIAL

## 2022-06-27 VITALS — BODY MASS INDEX: 24.05 KG/M2 | HEIGHT: 67.5 IN | WEIGHT: 155 LBS

## 2022-06-27 DIAGNOSIS — M25.512 BILATERAL SHOULDER PAIN, UNSPECIFIED CHRONICITY: ICD-10-CM

## 2022-06-27 DIAGNOSIS — S46.002A INJURY OF LEFT ROTATOR CUFF, INITIAL ENCOUNTER: Primary | ICD-10-CM

## 2022-06-27 DIAGNOSIS — S43.421A SPRAIN OF RIGHT ROTATOR CUFF CAPSULE, INITIAL ENCOUNTER: Primary | ICD-10-CM

## 2022-06-27 DIAGNOSIS — M25.511 BILATERAL SHOULDER PAIN, UNSPECIFIED CHRONICITY: ICD-10-CM

## 2022-06-27 PROCEDURE — 99204 OFFICE O/P NEW MOD 45 MIN: CPT | Performed by: ORTHOPAEDIC SURGERY

## 2022-06-27 PROCEDURE — 73030 X-RAY EXAM OF SHOULDER: CPT | Performed by: ORTHOPAEDIC SURGERY

## 2022-06-27 PROCEDURE — 3008F BODY MASS INDEX DOCD: CPT | Performed by: ORTHOPAEDIC SURGERY

## 2022-06-27 NOTE — TELEPHONE ENCOUNTER
Patient is wondering if provider can add MRI for the right shoulder and if insurance will cover both shoulders.  Please advise

## 2022-06-27 NOTE — TELEPHONE ENCOUNTER
Spoke to patient and informed of doctors recommendations below, patient verbalized understanding with intent to comply with treatment plan. No further questions or concerns at this time.

## 2022-06-29 ENCOUNTER — TELEPHONE (OUTPATIENT)
Dept: ORTHOPEDICS CLINIC | Facility: CLINIC | Age: 62
End: 2022-06-29

## 2022-06-29 NOTE — TELEPHONE ENCOUNTER
Attempted to reach patient no answer. Left message to contact the office    Dr Deshawn Nicole- Please advise on Test results and if pt needs to return to the office    Right shoulder MRI 6/27  IMPRESSION:   1. Low-grade partial-thickness bursal surface tear/fraying of the supraspinatus and infraspinatus tendons, superimposed on mild tendinosis. 2. Mild subscapularis and biceps tendinosis. 3. Slight irregularity/fraying at the posterior superior chondrolabral junction. Mild heterogeneity of the superior labrum. If there is clinical concern for labral tear, MR arthrogram may be helpful for further assessment. 4. Mild osteoarthritic changes of the acromion clavicular joint. 5. Mild subacromial subdeltoid bursitis. Left Shoulder MRI 6/27  IMPRESSION:   1. Moderate subacromial/subdeltoid bursitis. 2. Mild supraspinatus tendinosis with mild partial bursal surface fraying/tearing. No evidence of a full-thickness rotator cuff tendon tear. 3. Mild acromioclavicular joint osteoarthritis.

## 2022-06-29 NOTE — TELEPHONE ENCOUNTER
Patient called stating that she has not heard back from [address] [place] [address] regarding MRI TR. Patient states that she was told by [address] [place] [address] that he would call her to discuss results. As per last office note patient needs to be seen at office to go over MRI TR. Patient declined to get an appt scheduled, got angry and hung up. \"FOLLOW-UP:   Return to clinic after MRI completion.  \"

## 2022-07-26 ENCOUNTER — TELEPHONE (OUTPATIENT)
Dept: ORTHOPEDICS CLINIC | Facility: CLINIC | Age: 62
End: 2022-07-26

## 2022-07-26 NOTE — TELEPHONE ENCOUNTER
Appt scheduled  Future Appointments   Date Time Provider Shilo Dacosta   8/15/2022  8:30 AM Beryl Alex MD [facility]   8/22/2022 10:20 AM Deja Johnson MD [facility] [id number]

## 2022-07-26 NOTE — TELEPHONE ENCOUNTER
Spoke to patient and discussed PRP procedure. Patient verbalized understanding of procedure, instructions prior and what to montior post PRP. Patient is inquiring if she can have both shoulders injected?     Please advise

## 2022-08-17 ENCOUNTER — HOSPITAL ENCOUNTER (OUTPATIENT)
Dept: MAMMOGRAPHY | Age: 62
Discharge: HOME | End: 2022-08-17
Attending: OBSTETRICS & GYNECOLOGY

## 2022-08-17 DIAGNOSIS — Z12.31 VISIT FOR SCREENING MAMMOGRAM: ICD-10-CM

## 2022-08-17 PROCEDURE — 77067 SCR MAMMO BI INCL CAD: CPT

## 2022-08-22 ENCOUNTER — TELEPHONE (OUTPATIENT)
Dept: ORTHOPEDICS CLINIC | Facility: CLINIC | Age: 62
End: 2022-08-22

## 2022-08-22 ENCOUNTER — OFFICE VISIT (OUTPATIENT)
Dept: ORTHOPEDICS CLINIC | Facility: CLINIC | Age: 62
End: 2022-08-22
Payer: COMMERCIAL

## 2022-08-22 DIAGNOSIS — S46.002A INJURY OF LEFT ROTATOR CUFF, INITIAL ENCOUNTER: ICD-10-CM

## 2022-08-22 DIAGNOSIS — S43.421A SPRAIN OF RIGHT ROTATOR CUFF CAPSULE, INITIAL ENCOUNTER: Primary | ICD-10-CM

## 2022-08-22 DIAGNOSIS — M75.110 NONTRAUMATIC INCOMPLETE TEAR OF ROTATOR CUFF, UNSPECIFIED LATERALITY: ICD-10-CM

## 2022-08-22 NOTE — TELEPHONE ENCOUNTER
Patient stated that Dr. Gilford Ables had agreed at her visit today that he would be able to enter a PT order for CEE shoulder. Patient states there is a PT order for her RT shoulder only. Patient requesting order for CEE shoulder. Please advise.

## 2022-08-23 NOTE — PROCEDURES
Bilateral Shoulder Glenohumeral Joint PRP Injection    Name: Columba Mace   MRN: [id number]  Date: 8/22/2022     Clinical Indications:   Partial thickness Rotator Cuff Tear with symptoms refractory to conservative measures. After informed consent, the injection site was marked, sterilized with topical chlorhexidine antiseptic, and locally anesthetized with skin refrigerant. The patient was seated upright and the shoulder was exposed. Using sterile technique: 12 cc of PRP was injected with a Anterior approach into each shoulder utilizing a 21 gauge needle. A band-aid was applied. The patient tolerated the procedure well. Disposition:   Return to clinic on an as needed basis. Celeste Goodrich. Deshawn Nicole MD  Knee, Shoulder, & Elbow Surgery / Sports Medicine Specialist  [facility]  [facility] , trini , [facility]. Cari Curiel. [email or web address]  t: [phone]  o: [phone]  f: [phone]

## 2022-08-26 ENCOUNTER — OFFICE VISIT (OUTPATIENT)
Dept: OTHER | Facility: HOSPITAL | Age: 62
End: 2022-08-26
Attending: PREVENTIVE MEDICINE

## 2022-08-26 DIAGNOSIS — Z01.84 IMMUNITY STATUS TESTING: Primary | ICD-10-CM

## 2022-08-26 LAB
HBV SURFACE AG SER-ACNC: <0.1 [IU]/L
HBV SURFACE AG SERPL QL IA: NONREACTIVE
HCV AB SERPL QL IA: NONREACTIVE
HIV 1+2 AB+HIV1 P24 AG SERPL QL IA: NONREACTIVE

## 2022-08-26 PROCEDURE — 86803 HEPATITIS C AB TEST: CPT

## 2022-08-26 PROCEDURE — 86701 HIV-1ANTIBODY: CPT

## 2022-08-26 PROCEDURE — 87340 HEPATITIS B SURFACE AG IA: CPT

## 2022-10-17 ENCOUNTER — OFFICE VISIT (OUTPATIENT)
Dept: INTERNAL MEDICINE CLINIC | Facility: CLINIC | Age: 62
End: 2022-10-17
Payer: COMMERCIAL

## 2022-10-17 ENCOUNTER — LAB ENCOUNTER (OUTPATIENT)
Dept: LAB | Age: 62
End: 2022-10-17
Attending: PHYSICIAN ASSISTANT
Payer: COMMERCIAL

## 2022-10-17 VITALS
RESPIRATION RATE: 16 BRPM | BODY MASS INDEX: 24.48 KG/M2 | HEIGHT: 67 IN | SYSTOLIC BLOOD PRESSURE: 124 MMHG | TEMPERATURE: 98 F | WEIGHT: 156 LBS | HEART RATE: 61 BPM | OXYGEN SATURATION: 99 % | DIASTOLIC BLOOD PRESSURE: 76 MMHG

## 2022-10-17 DIAGNOSIS — E78.5 HYPERLIPIDEMIA, UNSPECIFIED HYPERLIPIDEMIA TYPE: ICD-10-CM

## 2022-10-17 DIAGNOSIS — Z00.00 ANNUAL PHYSICAL EXAM: Primary | ICD-10-CM

## 2022-10-17 DIAGNOSIS — Z00.00 ANNUAL PHYSICAL EXAM: ICD-10-CM

## 2022-10-17 LAB
ALT SERPL-CCNC: 19 U/L
ANION GAP SERPL CALC-SCNC: 3 MMOL/L (ref 0–18)
AST SERPL-CCNC: 12 U/L (ref 15–37)
BUN BLD-MCNC: 12 MG/DL (ref 7–18)
CALCIUM BLD-MCNC: 9.5 MG/DL (ref 8.5–10.1)
CHLORIDE SERPL-SCNC: 105 MMOL/L (ref 98–112)
CHOLEST SERPL-MCNC: 251 MG/DL (ref ?–200)
CO2 SERPL-SCNC: 30 MMOL/L (ref 21–32)
CREAT BLD-MCNC: 0.95 MG/DL
FASTING PATIENT LIPID ANSWER: YES
FASTING STATUS PATIENT QL REPORTED: YES
GFR SERPLBLD BASED ON 1.73 SQ M-ARVRAT: 68 ML/MIN/1.73M2 (ref 60–?)
GLUCOSE BLD-MCNC: 98 MG/DL (ref 70–99)
HDLC SERPL-MCNC: 73 MG/DL (ref 40–59)
LDLC SERPL CALC-MCNC: 170 MG/DL (ref ?–100)
NONHDLC SERPL-MCNC: 178 MG/DL (ref ?–130)
OSMOLALITY SERPL CALC.SUM OF ELEC: 286 MOSM/KG (ref 275–295)
POTASSIUM SERPL-SCNC: 4.8 MMOL/L (ref 3.5–5.1)
SODIUM SERPL-SCNC: 138 MMOL/L (ref 136–145)
TRIGL SERPL-MCNC: 52 MG/DL (ref 30–149)
VLDLC SERPL CALC-MCNC: 10 MG/DL (ref 0–30)

## 2022-10-17 PROCEDURE — 84450 TRANSFERASE (AST) (SGOT): CPT | Performed by: PHYSICIAN ASSISTANT

## 2022-10-17 PROCEDURE — 84460 ALANINE AMINO (ALT) (SGPT): CPT | Performed by: PHYSICIAN ASSISTANT

## 2022-10-17 PROCEDURE — 3074F SYST BP LT 130 MM HG: CPT | Performed by: PHYSICIAN ASSISTANT

## 2022-10-17 PROCEDURE — 80048 BASIC METABOLIC PNL TOTAL CA: CPT | Performed by: PHYSICIAN ASSISTANT

## 2022-10-17 PROCEDURE — 3008F BODY MASS INDEX DOCD: CPT | Performed by: PHYSICIAN ASSISTANT

## 2022-10-17 PROCEDURE — 99396 PREV VISIT EST AGE 40-64: CPT | Performed by: PHYSICIAN ASSISTANT

## 2022-10-17 PROCEDURE — 3078F DIAST BP <80 MM HG: CPT | Performed by: PHYSICIAN ASSISTANT

## 2022-10-17 PROCEDURE — 80061 LIPID PANEL: CPT | Performed by: PHYSICIAN ASSISTANT

## 2022-10-17 RX ORDER — CALCIUM CARBONATE/VITAMIN D3 500 MG-10
TABLET ORAL
COMMUNITY

## 2022-10-17 RX ORDER — CLOBETASOL PROPIONATE 0.5 MG/G
CREAM TOPICAL
COMMUNITY

## 2022-10-17 RX ORDER — CLOBETASOL PROPIONATE 0.5 MG/G
CREAM TOPICAL
COMMUNITY
End: 2022-10-17

## 2022-10-17 NOTE — PATIENT INSTRUCTIONS
Blood work today. Please focus on a diet consisting of lean or plant based proteins, fruits, veggies, and whole grains, as well as regular exercise (30 minutes daily). Follow up visit with [facility] in 1 year.

## 2022-10-18 DIAGNOSIS — E78.00 HYPERCHOLESTEROLEMIA: Primary | ICD-10-CM

## 2023-01-12 ENCOUNTER — TELEMEDICINE (OUTPATIENT)
Dept: INTERNAL MEDICINE CLINIC | Facility: CLINIC | Age: 63
End: 2023-01-12
Payer: COMMERCIAL

## 2023-01-12 DIAGNOSIS — R05.1 ACUTE COUGH: ICD-10-CM

## 2023-01-12 DIAGNOSIS — J01.90 ACUTE SINUSITIS, RECURRENCE NOT SPECIFIED, UNSPECIFIED LOCATION: Primary | ICD-10-CM

## 2023-01-12 PROCEDURE — 99213 OFFICE O/P EST LOW 20 MIN: CPT | Performed by: PHYSICIAN ASSISTANT

## 2023-01-12 RX ORDER — FLUTICASONE PROPIONATE 50 MCG
2 SPRAY, SUSPENSION (ML) NASAL DAILY
Qty: 16 G | Refills: 0 | Status: SHIPPED | OUTPATIENT
Start: 2023-01-12 | End: 2023-02-11

## 2023-01-12 RX ORDER — AMOXICILLIN AND CLAVULANATE POTASSIUM 875; 125 MG/1; MG/1
1 TABLET, FILM COATED ORAL 2 TIMES DAILY
Qty: 14 TABLET | Refills: 0 | Status: SHIPPED | OUTPATIENT
Start: 2023-01-12 | End: 2023-01-19

## 2023-02-02 ENCOUNTER — LAB ENCOUNTER (OUTPATIENT)
Dept: LAB | Age: 63
End: 2023-02-02
Attending: PHYSICIAN ASSISTANT
Payer: COMMERCIAL

## 2023-02-02 DIAGNOSIS — E78.00 HYPERCHOLESTEROLEMIA: ICD-10-CM

## 2023-02-02 LAB
CHOLEST SERPL-MCNC: 258 MG/DL (ref ?–200)
FASTING PATIENT LIPID ANSWER: YES
HDLC SERPL-MCNC: 72 MG/DL (ref 40–59)
LDLC SERPL CALC-MCNC: 180 MG/DL (ref ?–100)
NONHDLC SERPL-MCNC: 186 MG/DL (ref ?–130)
TRIGL SERPL-MCNC: 44 MG/DL (ref 30–149)
VLDLC SERPL CALC-MCNC: 9 MG/DL (ref 0–30)

## 2023-02-02 PROCEDURE — 80061 LIPID PANEL: CPT

## 2023-02-02 PROCEDURE — 36415 COLL VENOUS BLD VENIPUNCTURE: CPT

## 2023-02-03 DIAGNOSIS — E78.5 HYPERLIPIDEMIA, UNSPECIFIED HYPERLIPIDEMIA TYPE: Primary | ICD-10-CM

## 2023-02-03 RX ORDER — ATORVASTATIN CALCIUM 20 MG/1
20 TABLET, FILM COATED ORAL NIGHTLY
Qty: 90 TABLET | Refills: 1 | Status: SHIPPED | OUTPATIENT
Start: 2023-02-03

## 2023-02-15 ENCOUNTER — TELEPHONE (OUTPATIENT)
Dept: SURGERY | Facility: CLINIC | Age: 63
End: 2023-02-15

## 2023-02-15 NOTE — TELEPHONE ENCOUNTER
Pt made first avail appt for NP fluttering sensations in top left side of head in June. Pt is on waitlist in [facility] and psr spoke to nurse Lisa Bethae, who advised to send a TE so nursing can also put pt on waitlist. Pt aware .

## 2023-03-13 RX ORDER — FLUTICASONE PROPIONATE 50 MCG
SPRAY, SUSPENSION (ML) NASAL
Qty: 48 G | Refills: 0 | OUTPATIENT
Start: 2023-03-13

## 2023-03-13 RX ORDER — FLUTICASONE PROPIONATE 50 MCG
SPRAY, SUSPENSION (ML) NASAL
Qty: 16 G | Refills: 0 | Status: SHIPPED | OUTPATIENT
Start: 2023-03-13

## 2023-03-17 ENCOUNTER — TELEPHONE (OUTPATIENT)
Dept: SURGERY | Facility: CLINIC | Age: 63
End: 2023-03-17

## 2023-03-20 ENCOUNTER — OFFICE VISIT (OUTPATIENT)
Dept: NEUROLOGY | Facility: CLINIC | Age: 63
End: 2023-03-20
Payer: COMMERCIAL

## 2023-03-20 VITALS
HEART RATE: 67 BPM | WEIGHT: 148 LBS | BODY MASS INDEX: 23 KG/M2 | RESPIRATION RATE: 15 BRPM | DIASTOLIC BLOOD PRESSURE: 63 MMHG | SYSTOLIC BLOOD PRESSURE: 141 MMHG

## 2023-03-20 DIAGNOSIS — R20.9 SENSORY DISTURBANCE: Primary | ICD-10-CM

## 2023-03-20 PROCEDURE — 3078F DIAST BP <80 MM HG: CPT | Performed by: OTHER

## 2023-03-20 PROCEDURE — 99204 OFFICE O/P NEW MOD 45 MIN: CPT | Performed by: OTHER

## 2023-03-20 PROCEDURE — 3077F SYST BP >= 140 MM HG: CPT | Performed by: OTHER

## 2023-03-20 NOTE — PROGRESS NOTES
Pt reports she started having a fluttering sensation in left frontal side of head. This began on 2/12/2023. Pt has multiple episodes in February, last episode was 2/17/2023.

## 2023-06-02 ENCOUNTER — LAB ENCOUNTER (OUTPATIENT)
Dept: LAB | Age: 63
End: 2023-06-02
Attending: PHYSICIAN ASSISTANT
Payer: COMMERCIAL

## 2023-06-02 DIAGNOSIS — E78.5 HYPERLIPIDEMIA, UNSPECIFIED HYPERLIPIDEMIA TYPE: ICD-10-CM

## 2023-06-02 LAB
ALT SERPL-CCNC: 23 U/L
AST SERPL-CCNC: 15 U/L (ref 15–37)
CHOLEST SERPL-MCNC: 204 MG/DL (ref ?–200)
FASTING PATIENT LIPID ANSWER: YES
HDLC SERPL-MCNC: 71 MG/DL (ref 40–59)
LDLC SERPL CALC-MCNC: 123 MG/DL (ref ?–100)
NONHDLC SERPL-MCNC: 133 MG/DL (ref ?–130)
TRIGL SERPL-MCNC: 54 MG/DL (ref 30–149)
VLDLC SERPL CALC-MCNC: 9 MG/DL (ref 0–30)

## 2023-06-02 PROCEDURE — 36415 COLL VENOUS BLD VENIPUNCTURE: CPT

## 2023-06-02 PROCEDURE — 84450 TRANSFERASE (AST) (SGOT): CPT

## 2023-06-02 PROCEDURE — 84460 ALANINE AMINO (ALT) (SGPT): CPT

## 2023-06-02 PROCEDURE — 80061 LIPID PANEL: CPT

## 2023-10-09 DIAGNOSIS — Z12.31 VISIT FOR SCREENING MAMMOGRAM: ICD-10-CM

## 2023-10-09 DIAGNOSIS — M81.0 AGE-RELATED OSTEOPOROSIS WITHOUT CURRENT PATHOLOGICAL FRACTURE: Primary | ICD-10-CM

## 2023-10-16 ENCOUNTER — HOSPITAL ENCOUNTER (OUTPATIENT)
Dept: CT IMAGING | Age: 63
Discharge: HOME | End: 2023-10-16
Attending: NURSE PRACTITIONER

## 2023-10-16 ENCOUNTER — HOSPITAL ENCOUNTER (OUTPATIENT)
Dept: GENERAL RADIOLOGY | Age: 63
Discharge: HOME | End: 2023-10-16
Attending: NURSE PRACTITIONER

## 2023-10-16 DIAGNOSIS — Z12.31 VISIT FOR SCREENING MAMMOGRAM: ICD-10-CM

## 2023-10-16 DIAGNOSIS — M81.0 AGE-RELATED OSTEOPOROSIS WITHOUT CURRENT PATHOLOGICAL FRACTURE: ICD-10-CM

## 2023-10-16 PROCEDURE — 77080 DXA BONE DENSITY AXIAL: CPT

## 2023-10-16 PROCEDURE — 77063 BREAST TOMOSYNTHESIS BI: CPT

## 2023-10-17 PROBLEM — M85.89 OSTEOPENIA OF MULTIPLE SITES: Status: ACTIVE | Noted: 2023-10-17

## 2023-10-17 PROBLEM — M85.89 OSTEOPENIA OF MULTIPLE SITES: Chronic | Status: ACTIVE | Noted: 2023-10-17

## 2023-10-19 ENCOUNTER — OFFICE VISIT (OUTPATIENT)
Dept: INTERNAL MEDICINE CLINIC | Facility: CLINIC | Age: 63
End: 2023-10-19
Payer: COMMERCIAL

## 2023-10-19 VITALS
RESPIRATION RATE: 16 BRPM | BODY MASS INDEX: 22.51 KG/M2 | HEART RATE: 65 BPM | HEIGHT: 67 IN | SYSTOLIC BLOOD PRESSURE: 130 MMHG | DIASTOLIC BLOOD PRESSURE: 80 MMHG | TEMPERATURE: 96 F | OXYGEN SATURATION: 98 % | WEIGHT: 143.38 LBS

## 2023-10-19 DIAGNOSIS — E78.00 HYPERCHOLESTEROLEMIA: Chronic | ICD-10-CM

## 2023-10-19 DIAGNOSIS — M85.89 OSTEOPENIA OF MULTIPLE SITES: Chronic | ICD-10-CM

## 2023-10-19 DIAGNOSIS — Z23 NEED FOR IMMUNIZATION AGAINST INFLUENZA: ICD-10-CM

## 2023-10-19 DIAGNOSIS — Z00.00 ENCOUNTER FOR PREVENTATIVE ADULT HEALTH CARE EXAMINATION: Primary | ICD-10-CM

## 2023-10-19 PROBLEM — F41.1 GAD (GENERALIZED ANXIETY DISORDER): Chronic | Status: RESOLVED | Noted: 2020-07-23 | Resolved: 2023-10-19

## 2023-10-19 PROBLEM — M62.830 MUSCLE SPASM OF BACK: Status: RESOLVED | Noted: 2020-07-23 | Resolved: 2023-10-19

## 2023-10-19 PROBLEM — I49.3 PVC (PREMATURE VENTRICULAR CONTRACTION): Status: RESOLVED | Noted: 2017-05-11 | Resolved: 2023-10-19

## 2023-10-19 PROBLEM — R91.8: Status: RESOLVED | Noted: 2021-09-21 | Resolved: 2023-10-19

## 2023-10-19 PROCEDURE — 90686 IIV4 VACC NO PRSV 0.5 ML IM: CPT | Performed by: INTERNAL MEDICINE

## 2023-10-19 PROCEDURE — 99396 PREV VISIT EST AGE 40-64: CPT | Performed by: INTERNAL MEDICINE

## 2023-10-19 PROCEDURE — 90471 IMMUNIZATION ADMIN: CPT | Performed by: INTERNAL MEDICINE

## 2023-10-19 PROCEDURE — 3008F BODY MASS INDEX DOCD: CPT | Performed by: INTERNAL MEDICINE

## 2023-10-19 PROCEDURE — 3075F SYST BP GE 130 - 139MM HG: CPT | Performed by: INTERNAL MEDICINE

## 2023-10-19 PROCEDURE — 3079F DIAST BP 80-89 MM HG: CPT | Performed by: INTERNAL MEDICINE

## 2023-10-19 RX ORDER — CYCLOBENZAPRINE HCL 10 MG
TABLET ORAL
COMMUNITY
End: 2023-10-19

## 2023-10-19 RX ORDER — LORATADINE 10 MG
TABLET ORAL
COMMUNITY
End: 2023-10-19

## 2023-10-19 RX ORDER — ALPRAZOLAM 0.25 MG/1
1 TABLET ORAL 2 TIMES DAILY PRN
COMMUNITY
End: 2023-10-19

## 2023-10-19 NOTE — PATIENT INSTRUCTIONS
- Flu shot given today  - Get blood tests done at [address] [facility] labs when fasting (water only for 8 hours)  - Otherwise you are up to date with screenings  - Follow up in 1 year for physical/chronic issues; follow up earlier as needed. It was a pleasure seeing you in the clinic today. Thank you for choosing the [facility] office for your healthcare needs. Please call at [phone] with any questions or concerns.     Gomez Tavares MD

## 2023-11-01 RX ORDER — ESTRADIOL 10 UG/1
INSERT VAGINAL
COMMUNITY
Start: 2022-09-24

## 2023-12-15 ENCOUNTER — TELEPHONE (OUTPATIENT)
Dept: INTERNAL MEDICINE CLINIC | Facility: CLINIC | Age: 63
End: 2023-12-15

## 2023-12-15 RX ORDER — AMOXICILLIN AND CLAVULANATE POTASSIUM 875; 125 MG/1; MG/1
1 TABLET, FILM COATED ORAL 2 TIMES DAILY
Qty: 14 TABLET | Refills: 0 | Status: SHIPPED | OUTPATIENT
Start: 2023-12-15 | End: 2023-12-22

## 2023-12-15 NOTE — TELEPHONE ENCOUNTER
Patient called in requesting an antibiotic, states she may have a sinus infection. Pt stated she has been taking otc mediation and nothing seems to be working. Did advise pt she will need to be in order for RP to prescribe medication. Please advise.

## 2023-12-15 NOTE — TELEPHONE ENCOUNTER
C/o feeling sinus pressure around face/head, nasal congestion with yellow mucus, productive cough with yellow mucus, PND, and chills x 1.5 weeks. Using flonase nasal spray with some help and OTC mucinex but feels its too drying. Denies ear pain, sore throat, respiratory problems, chest pain, and fever. She has COVID in Nov.     Requesting rx for antibiotic, please advise?     [facility] in [place], No abx allergies

## 2023-12-18 ENCOUNTER — TELEPHONE (OUTPATIENT)
Dept: INTERNAL MEDICINE CLINIC | Facility: CLINIC | Age: 63
End: 2023-12-18

## 2023-12-18 ENCOUNTER — OFFICE VISIT (OUTPATIENT)
Dept: INTERNAL MEDICINE CLINIC | Facility: CLINIC | Age: 63
End: 2023-12-18
Payer: COMMERCIAL

## 2023-12-18 VITALS
WEIGHT: 145.38 LBS | TEMPERATURE: 99 F | DIASTOLIC BLOOD PRESSURE: 54 MMHG | OXYGEN SATURATION: 99 % | SYSTOLIC BLOOD PRESSURE: 150 MMHG | HEIGHT: 67 IN | BODY MASS INDEX: 22.82 KG/M2 | HEART RATE: 42 BPM

## 2023-12-18 DIAGNOSIS — R07.89 CHEST PRESSURE: Primary | ICD-10-CM

## 2023-12-18 DIAGNOSIS — J01.90 ACUTE BACTERIAL RHINOSINUSITIS: Primary | ICD-10-CM

## 2023-12-18 DIAGNOSIS — R09.89 CHEST CONGESTION: ICD-10-CM

## 2023-12-18 DIAGNOSIS — R07.89 CHEST PRESSURE: ICD-10-CM

## 2023-12-18 DIAGNOSIS — J01.90 ACUTE BACTERIAL RHINOSINUSITIS: ICD-10-CM

## 2023-12-18 DIAGNOSIS — B96.89 ACUTE BACTERIAL RHINOSINUSITIS: ICD-10-CM

## 2023-12-18 DIAGNOSIS — B96.89 ACUTE BACTERIAL RHINOSINUSITIS: Primary | ICD-10-CM

## 2023-12-18 LAB
ATRIAL RATE: 74 BPM
P AXIS: 58 DEGREES
P-R INTERVAL: 200 MS
Q-T INTERVAL: 394 MS
QRS DURATION: 94 MS
QTC CALCULATION (BEZET): 437 MS
R AXIS: 13 DEGREES
T AXIS: 13 DEGREES
VENTRICULAR RATE: 74 BPM

## 2023-12-18 RX ORDER — ALBUTEROL SULFATE 90 UG/1
2 AEROSOL, METERED RESPIRATORY (INHALATION) EVERY 6 HOURS PRN
Qty: 18 G | Refills: 0 | Status: SHIPPED | OUTPATIENT
Start: 2023-12-18

## 2023-12-18 RX ORDER — RIBOFLAVIN (VITAMIN B2) 100 MG
100 TABLET ORAL DAILY
COMMUNITY

## 2023-12-18 NOTE — TELEPHONE ENCOUNTER
Rufina Kayley with [address] xray called to request order for xray of the chest be re ordered for 2 views PA/Lateral.  Fax order to [phone]

## 2023-12-18 NOTE — PATIENT INSTRUCTIONS
You can use over the counter allegra, claritin or zyrtec for the sinus congestion. Please bring your machine into your next office visit to ensure it is accurate. Please measure your blood pressure and pulse daily and record these values. Please measure your blood pressure once daily after you have been resting for at least 5 minutes. Both feet should be flat on the ground and you should be seated with your back supported. Please communicate your blood pressures to me in 14 days.

## 2023-12-19 ENCOUNTER — TELEPHONE (OUTPATIENT)
Dept: INTERNAL MEDICINE CLINIC | Facility: CLINIC | Age: 63
End: 2023-12-19

## 2023-12-19 NOTE — TELEPHONE ENCOUNTER
Patient complains that rx albuterol is for \"wheezing\" she has no sxs or hx of wheezing   She does not want this in her chart or listed on her medications.   She asks for directions to be changed, went on to say that she would  not use inhaler until she has a new rx with new directions and wants the directions to state the \"real\" reason for use which is chest discomfort or congestion (pharmacy has already dispensed inhaler to patient) she is very rude and angry during call

## 2023-12-21 ENCOUNTER — PATIENT MESSAGE (OUTPATIENT)
Dept: INTERNAL MEDICINE CLINIC | Facility: CLINIC | Age: 63
End: 2023-12-21

## 2023-12-21 RX ORDER — AMOXICILLIN AND CLAVULANATE POTASSIUM 875; 125 MG/1; MG/1
1 TABLET, FILM COATED ORAL 2 TIMES DAILY
Qty: 14 TABLET | Refills: 0 | Status: SHIPPED | OUTPATIENT
Start: 2023-12-21 | End: 2023-12-28

## 2023-12-21 NOTE — TELEPHONE ENCOUNTER
Patient is calling to request for \"\" to send additional abx to pharmacy because she is not feeling any better and she wants to cover her bases because she is supposed to be taking the last dose on Christmas Laz[place]. She states that if she feels better before then, she wouldn't fill it.

## 2023-12-22 NOTE — TELEPHONE ENCOUNTER
KS/RP: please review CXR from [address] Can view results in [address] from 12/18/23. Thanks!

## 2023-12-22 NOTE — TELEPHONE ENCOUNTER
From: Javid Dennis Daily  To: Ahsan Bob  Sent: 12/21/2023 4:34 PM CST  Subject: X-ray done for Dr. Adela Conrad    I had the chest x-ray done on Tuesday, 12/19, at [address] [place] [address]

## 2024-01-15 ENCOUNTER — TELEPHONE (OUTPATIENT)
Dept: INTERNAL MEDICINE CLINIC | Facility: CLINIC | Age: 64
End: 2024-01-15

## 2024-01-15 NOTE — TELEPHONE ENCOUNTER
Spoke to patient. C/o continued symptoms since she's had COVID on 11/5. Now feels more sinus congestion, head ache and productive cough with green mucus. Also woke up today with both eyes crusted over and redness. States grandson also has eye discharge and redness x last 3 days. She thinks it may be pink eye. Denies fever, chills, body aches, ear pain, CP, SOB, wheezing. Has been taking OTC allegra, Flonase and nasal saline rinses without help. Did complete 2 rounds of antibiotics previously and normal Chest XR per patient message from 12/21.  Advised patient to proceed to [facility] for evaluation of symptoms. Patient verbalized understanding.

## 2024-01-15 NOTE — TELEPHONE ENCOUNTER
Patient called to request a call regarding the continuous congestion, cough and now possible pink eye. She states she woke up today with crusty eye and discharge.   She also states she has completed 2 rounds of antibiotics and still symptoms are present.   Patient is requesting a call back.   Please advise.

## 2024-01-16 ENCOUNTER — OFFICE VISIT (OUTPATIENT)
Dept: FAMILY MEDICINE CLINIC | Facility: CLINIC | Age: 64
End: 2024-01-16
Payer: COMMERCIAL

## 2024-01-16 VITALS
TEMPERATURE: 97 F | HEIGHT: 67 IN | BODY MASS INDEX: 23.39 KG/M2 | OXYGEN SATURATION: 99 % | DIASTOLIC BLOOD PRESSURE: 86 MMHG | WEIGHT: 149 LBS | HEART RATE: 80 BPM | RESPIRATION RATE: 16 BRPM | SYSTOLIC BLOOD PRESSURE: 140 MMHG

## 2024-01-16 DIAGNOSIS — R03.0 ELEVATED BLOOD PRESSURE READING: ICD-10-CM

## 2024-01-16 DIAGNOSIS — J32.9 RHINOSINUSITIS: Primary | ICD-10-CM

## 2024-01-16 DIAGNOSIS — B30.9 ACUTE VIRAL CONJUNCTIVITIS OF BOTH EYES: ICD-10-CM

## 2024-01-16 PROCEDURE — 3079F DIAST BP 80-89 MM HG: CPT

## 2024-01-16 PROCEDURE — 99213 OFFICE O/P EST LOW 20 MIN: CPT

## 2024-01-16 PROCEDURE — 3008F BODY MASS INDEX DOCD: CPT

## 2024-01-16 PROCEDURE — 3077F SYST BP >= 140 MM HG: CPT

## 2024-01-16 RX ORDER — OFLOXACIN 3 MG/ML
1 SOLUTION/ DROPS OPHTHALMIC 4 TIMES DAILY
Qty: 5 ML | Refills: 0 | Status: SHIPPED | OUTPATIENT
Start: 2024-01-16 | End: 2024-01-23

## 2024-01-16 NOTE — PROGRESS NOTES
CHIEF COMPLAINT:     Chief Complaint   Patient presents with    Cold     Symptoms since 11/5 Positive Covid in Nov  Sinus congestion, headache, productive cough, green mucus, bilat eye crust this AM, eye redness  No fever, body aches, chills  OTC Allegra, Flonase, nasal saline rinses, finished 2 rounds of antibiotics - normal CXR 12/18       HPI:   Britany Hairston is a 63 year old female who presents for upper respiratory symptoms for  3 months. Patient reports congestion, green colored nasal discharge, dry cough, headache and bilateral eye crusting in the am, denies fever. Symptoms have been without change since onset.  Treating symptoms with flonase, allegra and nasal saline. Reports that symptoms started 11/05 when she tested positive for COVID. States that since then has had a chest x-ray which was negative and treated with antibiotics twice. States that she was watching her 3 grandkids and one had conjunctivitis.      Current Outpatient Medications   Medication Sig Dispense Refill    Cholecalciferol (VITAMIN D3) 25 MCG (1000 UT) Oral Cap Take 1 tablet by mouth daily.      Ascorbic Acid (VITAMIN C) 100 MG Oral Tab Take 1 tablet (100 mg total) by mouth daily.      Calcium Carb-Cholecalciferol (OYSTER SHELL CALCIUM) 500-400 MG-UNIT Oral Tab       Nutritional Supplements (JUICE PLUS FIBRE OR) Take 1 tablet by mouth daily.      omega-3 fatty acids 1000 MG Oral Cap Take 1,000 mg by mouth daily.        Past Medical History:   Diagnosis Date    Abdominal hernia     when I was a teenager    Abdominal pain 2019    sludge in my gall bladder?    Allergic rhinitis     Anemia, unspecified     Arthritis     old age    Back pain     whenever I work too much in the yard    Belching     Bereavement, uncomplicated 06/30/2011    Bloating     Esophageal reflux     Flatulence/gas pain/belching     JOANA (generalized anxiety disorder)     Heart palpitations 1981    Heavy menses     when I had it    Hemorrhoids     after babies     Indigestion     Mouth dryness 11/01/2011    Muscle spasm of back     Myalgia and myositis, unspecified 11/01/2011    Night sweats     during menopause/not now    Nonspecific abnormal findings on imaging of lung     Open wound of toe(s), without mention of complication 06/30/2011    fifth toe    Pain in joint, multiple sites 11/01/2011    Painful urination     in my 20/30's    Phlebitis and thrombophlebitis of superficial vessels of lower extremities 06/30/2011    Positive CIARA (antinuclear antibody)     Presence of other cardiac implants and grafts     marker in right breast    PVC (premature ventricular contraction)     Scleritis, unspecified 06/30/2011    Unspecified adverse effect of unspecified drug, medicinal and biological substance 11/01/2011    Wears glasses 80's      Past Surgical History:   Procedure Laterality Date    COLONOSCOPY  2011 per pt    COLONOSCOPY  08/24/2021    int hemorrhoids - repeat in 10 years (Dr. Brewer)    OTHER SURGICAL HISTORY      right breast biopsy- benign    OTHER SURGICAL HISTORY  Fall 2012    L hip labral repair    OTHER SURGICAL HISTORY  08/2021    EGD         Social History     Socioeconomic History    Marital status:    Tobacco Use    Smoking status: Never    Smokeless tobacco: Never   Vaping Use    Vaping Use: Never used   Substance and Sexual Activity    Alcohol use: Yes     Alcohol/week: 0.0 - 1.0 standard drinks of alcohol     Comment: 1 drink every 3-4 months    Drug use: No   Other Topics Concern    Caffeine Concern Yes     Comment: 1 cup of coffee daily    Stress Concern Yes    Weight Concern No    Special Diet No    Exercise Yes     Comment: Daily    Seat Belt Yes         REVIEW OF SYSTEMS:   GENERAL: no change in appetite  SKIN: no rashes or abnormal skin lesions  HEENT: See HPI  LUNGS: See HPI  CARDIOVASCULAR: denies chest pain or palpitations   GI: denies N/V/C or abdominal pain      EXAM:   /86   Pulse 80   Temp 97.4 °F (36.3 °C)   Resp 16   Ht 5'  7\" (1.702 m)   Wt 149 lb (67.6 kg)   LMP 03/14/2012   SpO2 99%   BMI 23.34 kg/m²   GENERAL: well developed, well nourished,in no apparent distress  SKIN: no rashes,no suspicious lesions  HEAD: atraumatic, normocephalic.  no tenderness on palpation of frontal and maxillary sinuses  EYES: conjunctiva clear, EOM intact, no drainage  EARS: TM's pearly and intact, no bulging, no retraction,no fluid, bony landmarks visualized  NOSE: Nostrils patent, no nasal discharge, nasal mucosa non-inflamed   THROAT: Oral mucosa pink, moist. Posterior pharynx is non erythematous with non-purulent drainage. no exudates. Tonsils 1/4.    NECK: Supple, non-tender  LUNGS: clear to auscultation bilaterally, no wheezes or rhonchi. Breathing is non labored. Dry cough during exam  CARDIO: RRR without murmur  EXTREMITIES: no cyanosis, clubbing or edema  LYMPH:  no lymphadenopathy.        ASSESSMENT AND PLAN:   Britany Hairston is a 63 year old female who presents with upper respiratory symptoms that are consistent with    ASSESSMENT:   Encounter Diagnoses   Name Primary?    Rhinosinusitis Yes    Acute viral conjunctivitis of both eyes     Elevated blood pressure reading        PLAN: Meds as below.  Comfort care as described in Patient Instructions. Discussion about supportive treatment including over the counter medications, hydration, rest and sinus rinses. Discussion about need to follow up with PCP if symptoms continue despite multiple rounds of antibiotics as she will need further work up.     Meds & Refills for this Visit:  Requested Prescriptions     Signed Prescriptions Disp Refills    ofloxacin 0.3 % Ophthalmic Solution 5 mL 0     Sig: Place 1 drop into both eyes 4 (four) times daily for 7 days.     Risks, benefits, and side effects of medication explained and discussed.    The patient indicates understanding of these issues and agrees to the plan.  The patient is asked to f/u with PCP if sx's persist or worsen.  Patient  Instructions   Archie Med Sinus Rinses   Sudafed or Mucinex

## 2024-01-22 ENCOUNTER — OFFICE VISIT (OUTPATIENT)
Dept: INTERNAL MEDICINE CLINIC | Facility: CLINIC | Age: 64
End: 2024-01-22
Payer: COMMERCIAL

## 2024-01-22 ENCOUNTER — LAB ENCOUNTER (OUTPATIENT)
Dept: LAB | Age: 64
End: 2024-01-22
Attending: INTERNAL MEDICINE
Payer: COMMERCIAL

## 2024-01-22 VITALS
OXYGEN SATURATION: 97 % | WEIGHT: 145.81 LBS | DIASTOLIC BLOOD PRESSURE: 82 MMHG | HEIGHT: 67 IN | HEART RATE: 55 BPM | RESPIRATION RATE: 16 BRPM | BODY MASS INDEX: 22.89 KG/M2 | TEMPERATURE: 97 F | SYSTOLIC BLOOD PRESSURE: 144 MMHG

## 2024-01-22 DIAGNOSIS — R05.8 POST-VIRAL COUGH SYNDROME: Primary | ICD-10-CM

## 2024-01-22 DIAGNOSIS — R03.0 ELEVATED BP WITHOUT DIAGNOSIS OF HYPERTENSION: ICD-10-CM

## 2024-01-22 PROCEDURE — 99213 OFFICE O/P EST LOW 20 MIN: CPT | Performed by: INTERNAL MEDICINE

## 2024-01-22 PROCEDURE — 3079F DIAST BP 80-89 MM HG: CPT | Performed by: INTERNAL MEDICINE

## 2024-01-22 PROCEDURE — 3077F SYST BP >= 140 MM HG: CPT | Performed by: INTERNAL MEDICINE

## 2024-01-22 PROCEDURE — 87798 DETECT AGENT NOS DNA AMP: CPT | Performed by: INTERNAL MEDICINE

## 2024-01-22 PROCEDURE — 3008F BODY MASS INDEX DOCD: CPT | Performed by: INTERNAL MEDICINE

## 2024-01-22 NOTE — PROGRESS NOTES
Patient Office Visit    ASSESSMENT AND PLAN:   1. Post-viral cough syndrome  Note: has been on 2 rounds of antibiotics and has tried over the counter treatment with not much relief. She has been up to date with Tdap. Will check for pertussis and referred her back to pulmonology. Could be due to post nasal drip as well   - Pulmonary Referral - In Network  - B.Pertussisb.Parapertussis Pcr [E]; Future  - B.Pertussisb.Parapertussis Pcr [E]    RTC prn for above     Patient/Caregiver Education: Patient/Caregiver Education: There are no barriers to learning. Medical education done. Outcome: Patient verbalizes understanding. Patient is notified to call with any questions, complications, allergies, or worsening or changing symptoms.  Patient is to call with any side effects or complications from the treatments as a result of today.      Reviewed Past Medical History and   Patient Active Problem List   Diagnosis    Mitral valve prolapse - dx'd at least since 1/17/02 at [facility] via Echocardiogram    Non-rheumatic mitral regurgitation    Hypercholesterolemia    Chronic allergic rhinitis    Primary insomnia    Osteopenia of multiple sites       Orders Placed This Encounter   Procedures    B.Pertussisb.Parapertussis Pcr [E]     Standing Status:   Future     Number of Occurrences:   1     Standing Expiration Date:   1/22/2025     Order Specific Question:   Release to patient     Answer:   Immediate     Requested Prescriptions      No prescriptions requested or ordered in this encounter         Shena Blevins DO  CC:  Chief Complaint   Patient presents with    Follow - Up         HPI:   Britany Hairston is a 63-year-old female who presents to the postviral cough.    Postviral cough: Has been going on for more than 2 months.  She has been on 2 rounds of antibiotics and has been doing nasal rinses with Sudafed but not much relief.  She spoke to her sister and there was a concern for whooping cough and  she would like that tested.  She was at the urgent care recently and he mentions long COVID to her given the cough has been persisting since having COVID.  Sometimes the cough is wet and other times it is dry.  She is also noticed that she has been getting PVCs at the college.  She has seen a pulmonologist in  to check for \"second hand smoke.\"    Past Medical History:   Diagnosis Date    Abdominal hernia     when I was a teenager    Abdominal pain 2019    sludge in my gall bladder?    Allergic rhinitis     Anemia, unspecified     Arthritis     old age    Back pain     whenever I work too much in the yard    Belching     Bereavement, uncomplicated 2011    Bloating     Esophageal reflux     Flatulence/gas pain/belching     JOANA (generalized anxiety disorder)     Heart palpitations     Heavy menses     when I had it    Hemorrhoids     after babies    Hyperlipidemia     Hyperthyroidism     Indigestion     Mouth dryness 2011    Muscle spasm of back     Myalgia and myositis, unspecified 2011    Night sweats     during menopause/not now    Nonspecific abnormal findings on imaging of lung     Open wound of toe(s), without mention of complication 2011    fifth toe    Osteoarthritis     Pain in joint, multiple sites 2011    Painful urination     in my 20's    Phlebitis and thrombophlebitis of superficial vessels of lower extremities 2011    Positive CIARA (antinuclear antibody)     Presence of other cardiac implants and grafts     marker in right breast    PVC (premature ventricular contraction)     Scleritis, unspecified 2011    Unspecified adverse effect of unspecified drug, medicinal and biological substance 2011    Wears glasses 80's       Past Surgical History:   Procedure Laterality Date    COLONOSCOPY   per pt    COLONOSCOPY  2021    int hemorrhoids - repeat in 10 years (Dr. Brewer)      1985, 1988, 1990    OTHER SURGICAL HISTORY       right breast biopsy- benign    OTHER SURGICAL HISTORY  Fall 2012    L hip labral repair    OTHER SURGICAL HISTORY  08/2021    EGD       Social History:  Social History     Socioeconomic History    Marital status:    Tobacco Use    Smoking status: Never    Smokeless tobacco: Never   Vaping Use    Vaping Use: Never used   Substance and Sexual Activity    Alcohol use: Yes     Alcohol/week: 0.0 - 1.0 standard drinks of alcohol     Comment: not often    Drug use: No   Other Topics Concern    Caffeine Concern No    Stress Concern No    Weight Concern No    Special Diet No    Exercise No    Seat Belt No     Family History:  Family History   Problem Relation Age of Onset    Cancer Father         prostate cancer    Hypertension Father     Alcohol and Other Disorders Associated Father         alcohol    Hypertension Brother     Other (Other) Brother         ETOH, drug abuse    Alcohol and Other Disorders Associated Brother         alcohol    Heart Attack Brother     Asthma Brother     Heart Disorder Brother     Hypertension Mother     Thyroid Disorder Mother     Lipids Mother     Other (Other) Mother         fibromyalgia    Heart Disorder Mother         Heart attack April 2023, congestive heart failure May 2023    Obesity Mother         5'4\" 190 lbs    Heart Disease Other         fam hx    Heart Disease Maternal Grandmother     Heart Disorder Maternal Grandmother         CHF    Mental Disorder Maternal Grandmother         schizophrenia    Other (Other) Brother         ETOH, drug abuse    Alcohol and Other Disorders Associated Sister         alcohol    Depression Sister         suicide 2011    Alcohol and Other Disorders Associated Sister         alcohol    Depression Sister     Alcohol and Other Disorders Associated Brother         alcohol    Hypertension Brother     Alcohol and Other Disorders Associated Sister         alcohol    Hypertension Sister      Allergies:  Allergies   Allergen Reactions    Terbinafine  HIVES    Xanax [Alprazolam] OTHER (SEE COMMENTS)     Dry mouth  Aching joints  -- occurs only with regular use of 0.5 mg+     Current Meds:  Current Outpatient Medications on File Prior to Visit   Medication Sig Dispense Refill    Cholecalciferol (VITAMIN D3) 25 MCG (1000 UT) Oral Cap Take 1 tablet by mouth daily.      Ascorbic Acid (VITAMIN C) 100 MG Oral Tab Take 1 tablet (100 mg total) by mouth daily.      Calcium Carb-Cholecalciferol (OYSTER SHELL CALCIUM) 500-400 MG-UNIT Oral Tab       Nutritional Supplements (JUICE PLUS FIBRE OR) Take 1 tablet by mouth daily.      omega-3 fatty acids 1000 MG Oral Cap Take 1,000 mg by mouth daily.      ofloxacin 0.3 % Ophthalmic Solution Place 1 drop into both eyes 4 (four) times daily for 7 days. 5 mL 0     No current facility-administered medications on file prior to visit.         REVIEW OF SYSTEMS   Constitutional: no fatigue normal energy no weight changes   HENT: normal sinuses and no mouth issues   Eyes: . normal vision no eye pain   Respiratory: as above   Cardiovascular: no CP, or palpitations   Gastrointestinal: normal bowels and no abd pains   Genitourinary:  normal urination no hematuria, no frequency   Musculoskeletal: no pains in arms/legs, normal range of motion   Skin: no rashes or skin lesions that are new   Neurological:  no weakness, no numbness, normal gait   Hematological:  no bruises or bleeding   Psychiatric/Behavioral: normal mood no anxiety normal behavior     /82 (BP Location: Left arm, Patient Position: Sitting, Cuff Size: adult)   Pulse 55   Temp 97.3 °F (36.3 °C) (Temporal)   Resp 16   Ht 5' 7\" (1.702 m)   Wt 145 lb 12.8 oz (66.1 kg)   LMP 03/14/2012   SpO2 97%   BMI 22.84 kg/m²     PHYSICAL EXAM:   Constitutional: Vital signs reviewed as noted, well developed, in no acute distress.   HENT: NCAT  Eyes: pupils reactive bilaterally  Cardiovascular: nl s1 s2 no m/r/g  Pulmonary/Chest: CTA bilaterally with no wheezes  Extremities: no pedal  edema   Neurological:  no weakness in UE and LE, reflexes are normal  Skin: no rashes or bruises on visualized skin, not undressed   Psychiatric:normal mood

## 2024-01-22 NOTE — PATIENT INSTRUCTIONS
I will let you know what the pertussis results show  I have referred you to the pulmonologist for a follow up

## 2024-01-26 LAB
B PARAPERTUSS DNA: NEGATIVE
B PERTUSSIS DNA: NEGATIVE

## 2024-10-19 LAB
ABSOLUTE BASOPHILS: 70 CELLS/UL (ref 0–200)
ABSOLUTE EOSINOPHILS: 78 CELLS/UL (ref 15–500)
ABSOLUTE LYMPHOCYTES: 1632 CELLS/UL (ref 850–3900)
ABSOLUTE MONOCYTES: 459 CELLS/UL (ref 200–950)
ABSOLUTE NEUTROPHILS: 1861 CELLS/UL (ref 1500–7800)
ALBUMIN/GLOBULIN RATIO: 1.7 (CALC) (ref 1–2.5)
ALBUMIN: 4.7 G/DL (ref 3.6–5.1)
ALKALINE PHOSPHATASE: 49 U/L (ref 37–153)
ALT: 9 U/L (ref 6–29)
AST: 13 U/L (ref 10–35)
BASOPHILS: 1.7 %
BILIRUBIN, TOTAL: 0.8 MG/DL (ref 0.2–1.2)
BUN: 14 MG/DL (ref 7–25)
CALCIUM: 9.9 MG/DL (ref 8.6–10.4)
CARBON DIOXIDE: 31 MMOL/L (ref 20–32)
CHLORIDE: 103 MMOL/L (ref 98–110)
CHOL/HDLC RATIO: 3.3 (CALC)
CHOLESTEROL, TOTAL: 250 MG/DL
CREATININE: 0.95 MG/DL (ref 0.5–1.05)
EGFR: 67 ML/MIN/1.73M2
EOSINOPHILS: 1.9 %
GLOBULIN: 2.7 G/DL (CALC) (ref 1.9–3.7)
GLUCOSE: 92 MG/DL (ref 65–99)
HDL CHOLESTEROL: 75 MG/DL
HEMATOCRIT: 42 % (ref 35–45)
HEMOGLOBIN: 13.9 G/DL (ref 11.7–15.5)
LDL-CHOLESTEROL: 160 MG/DL (CALC)
LYMPHOCYTES: 39.8 %
MCH: 28.9 PG (ref 27–33)
MCHC: 33.1 G/DL (ref 32–36)
MCV: 87.3 FL (ref 80–100)
MONOCYTES: 11.2 %
NEUTROPHILS: 45.4 %
NON-HDL CHOLESTEROL: 175 MG/DL (CALC)
POTASSIUM: 5.3 MMOL/L (ref 3.5–5.3)
PROTEIN, TOTAL: 7.4 G/DL (ref 6.1–8.1)
RDW: 13 % (ref 11–15)
RED BLOOD CELL COUNT: 4.81 MILLION/UL (ref 3.8–5.1)
SODIUM: 139 MMOL/L (ref 135–146)
TRIGLYCERIDES: 48 MG/DL
WHITE BLOOD CELL COUNT: 4.1 THOUSAND/UL (ref 3.8–10.8)

## 2024-10-21 ENCOUNTER — OFFICE VISIT (OUTPATIENT)
Dept: INTERNAL MEDICINE CLINIC | Facility: CLINIC | Age: 64
End: 2024-10-21
Payer: COMMERCIAL

## 2024-10-21 VITALS
OXYGEN SATURATION: 97 % | WEIGHT: 149 LBS | HEART RATE: 53 BPM | RESPIRATION RATE: 14 BRPM | DIASTOLIC BLOOD PRESSURE: 80 MMHG | BODY MASS INDEX: 23.39 KG/M2 | SYSTOLIC BLOOD PRESSURE: 140 MMHG | TEMPERATURE: 98 F | HEIGHT: 67 IN

## 2024-10-21 DIAGNOSIS — I34.0 NON-RHEUMATIC MITRAL REGURGITATION: Chronic | ICD-10-CM

## 2024-10-21 DIAGNOSIS — M85.89 OSTEOPENIA OF MULTIPLE SITES: Chronic | ICD-10-CM

## 2024-10-21 DIAGNOSIS — Z00.00 ROUTINE PHYSICAL EXAMINATION: Primary | ICD-10-CM

## 2024-10-21 DIAGNOSIS — E78.2 MIXED HYPERLIPIDEMIA: ICD-10-CM

## 2024-10-21 PROBLEM — F51.01 PRIMARY INSOMNIA: Chronic | Status: RESOLVED | Noted: 2020-07-23 | Resolved: 2024-10-21

## 2024-10-21 PROBLEM — E78.00 HYPERCHOLESTEROLEMIA: Chronic | Status: RESOLVED | Noted: 2020-07-23 | Resolved: 2024-10-21

## 2024-10-21 PROCEDURE — 90656 IIV3 VACC NO PRSV 0.5 ML IM: CPT | Performed by: INTERNAL MEDICINE

## 2024-10-21 PROCEDURE — 90471 IMMUNIZATION ADMIN: CPT | Performed by: INTERNAL MEDICINE

## 2024-10-21 PROCEDURE — 99396 PREV VISIT EST AGE 40-64: CPT | Performed by: INTERNAL MEDICINE

## 2024-10-21 NOTE — PATIENT INSTRUCTIONS
Continue to exercise at least 150 minutes a week and Eat a plant based diet     Please take 2000 IU of vitamin D daily for life to keep your bones strong    Please see your dentist every 6 months  Please see a dermatologist yearly for skin checks    Continue with regular eye exams    You can take 600 mg of calcium through supplement and 600 mg of Diet.  However I have ordered your calcium CT score and you can recheck your cholesterol levels in 6 months.    You received your flu vaccine today and let us know your test results after you have the mammogram done    She is yearly for routine checkup

## 2024-10-21 NOTE — PROGRESS NOTES
Patient Office Visit    ASSESSMENT AND PLAN:   1. Routine physical examination  Note: Continue to exercise at least 150 minutes a week and Eat a plant based diet. Please take 2000 IU of vitamin D daily for life to keep your bones strong. Please see your dentist every 6 months.  Continue with regular eye exams.  Flu vaccine provided. Please see a dermatologist yearly for skin checks.  Will get her mammograms through her gynecologist    2. Mixed hyperlipidemia  Note: Patient will make some changes to her diet and repeat lipid panel in 6 months.  A calcium CT score also ordered  - Lipid Panel  - CT CALCIUM SCORING; Future    3. Non-rheumatic mitral regurgitation  Note: Asymptomatic and will continue to monitor.  She has seen the cardiologist in the past    4. Osteopenia of multiple sites  Note: Last bone density was in 2023.  Continue with vitamin D supplement. She wants to hold off on calcium supplement but did discuss that she can take 600 mg of calcium through supplement and 600 mg of calcium through diet    Return to clinic yearly for routine physical      Patient/Caregiver Education: Patient/Caregiver Education: There are no barriers to learning. Medical education done. Outcome: Patient verbalizes understanding. Patient is notified to call with any questions, complications, allergies, or worsening or changing symptoms.  Patient is to call with any side effects or complications from the treatments as a result of today.      Reviewed Past Medical History and   Patient Active Problem List   Diagnosis    Mitral valve prolapse - dx'd at least since 1/17/02 at [facility] via Echocardiogram    Non-rheumatic mitral regurgitation    Chronic allergic rhinitis    Osteopenia of multiple sites    Mixed hyperlipidemia       Orders Placed This Encounter   Procedures    Lipid Panel    Fluzone trivalent vaccine, PF 0.5mL, 6mo+ (61379)     Requested Prescriptions      No prescriptions requested or ordered in  this encounter         Shena Blevins DO  CC:  Chief Complaint   Patient presents with    Physical         HPI:   Britany Hairston is a 64-year-old female who presents for a routine physical    Hyperlipidemia: She was drinking 4 glasses of coconut milk daily and that was the only change she is made.  She was taking a calcium supplement 2 but she stopped as she was concerned it might affect the heart.  She will be going down to skim milk or she will try to alternate between coconut milk and almond milk.  Osteopenia: Gets her bone density through her gynecologist.  Mitral regurgitation: Asymptomatic    Past Medical History:    Abdominal hernia    when I was a teenager    Abdominal pain    sludge in my gall bladder?    Allergic rhinitis    Anemia, unspecified    Arthritis    old age    Back pain    whenever I work too much in the yard    Belching    Bereavement, uncomplicated    Bloating    Esophageal reflux    Flatulence/gas pain/belching    JOANA (generalized anxiety disorder)    Heart palpitations    Heavy menses    when I had it    Hemorrhoids    after babies    Hyperlipidemia    Hyperthyroidism    Indigestion    Mouth dryness    Muscle spasm of back    Myalgia and myositis, unspecified    Night sweats    during menopause/not now    Nonspecific abnormal findings on imaging of lung    Open wound of toe(s), without mention of complication    fifth toe    Osteoarthritis    Pain in joint, multiple sites    Painful urination    in my 20/30's    Phlebitis and thrombophlebitis of superficial vessels of lower extremities    Positive CIARA (antinuclear antibody)    Presence of other cardiac implants and grafts    marker in right breast    PVC (premature ventricular contraction)    Scleritis, unspecified    Unspecified adverse effect of unspecified drug, medicinal and biological substance    Wears glasses       Past Surgical History:   Procedure Laterality Date    Colonoscopy  2011 per pt    Colonoscopy  08/24/2021    int  hemorrhoids - repeat in 10 years (Dr. Brewer)      1985, 1988, 1990    Other surgical history      right breast biopsy- benign    Other surgical history  Fall     L hip labral repair    Other surgical history  2021    EGD       Social History:  Social History     Socioeconomic History    Marital status:    Tobacco Use    Smoking status: Never    Smokeless tobacco: Never   Vaping Use    Vaping status: Never Used   Substance and Sexual Activity    Alcohol use: Yes     Alcohol/week: 0.0 - 1.0 standard drinks of alcohol     Comment: not often    Drug use: No   Other Topics Concern    Caffeine Concern No    Stress Concern No    Weight Concern No    Special Diet No    Exercise No    Seat Belt No     Family History:  Family History   Problem Relation Age of Onset    Cancer Father         prostate cancer    Hypertension Father     Alcohol and Other Disorders Associated Father         alcohol    Hypertension Brother     Other (Other) Brother         ETOH, drug abuse    Alcohol and Other Disorders Associated Brother         alcohol    Heart Attack Brother     Asthma Brother     Heart Disorder Brother     Hypertension Mother     Thyroid Disorder Mother     Lipids Mother     Other (Other) Mother         fibromyalgia    Heart Disorder Mother         Heart attack 2023, congestive heart failure May 2023    Obesity Mother         5'4\" 190 lbs    Heart Disease Other         fam hx    Heart Disease Maternal Grandmother     Heart Disorder Maternal Grandmother         CHF    Mental Disorder Maternal Grandmother         schizophrenia    Other (Other) Brother         ETOH, drug abuse    Alcohol and Other Disorders Associated Sister         alcohol    Depression Sister         suicide     Alcohol and Other Disorders Associated Sister         alcohol    Depression Sister     Alcohol and Other Disorders Associated Brother         alcohol    Hypertension Brother     Alcohol and Other Disorders  Associated Sister         alcohol    Hypertension Sister      Allergies:  Allergies[1]  Current Meds:  Medications Ordered Prior to Encounter[2]      REVIEW OF SYSTEMS   Constitutional: no fatigue normal energy no weight changes   HENT: normal sinuses and no mouth issues   Eyes: . normal vision no eye pain   Respiratory: normal respirations no cough   Cardiovascular: no CP, or palpitations   Gastrointestinal: normal bowels and no abd pains   Genitourinary:  normal urination no hematuria, no frequency   Musculoskeletal: no pains in arms/legs, normal range of motion   Skin: no rashes or skin lesions that are new   Neurological:  no weakness, no numbness, normal gait   Hematological:  no bruises or bleeding   Psychiatric/Behavioral: normal mood no anxiety normal behavior     /80 (BP Location: Right arm, Patient Position: Sitting, Cuff Size: adult)   Pulse 53   Temp 97.8 °F (36.6 °C) (Temporal)   Resp 14   Ht 5' 7\" (1.702 m)   Wt 149 lb (67.6 kg)   LMP 03/14/2012   SpO2 97%   BMI 23.34 kg/m²     PHYSICAL EXAM:   Constitutional: Vital signs reviewed as noted, well developed, in no acute distress.   HENT: NCAT, bilateral ear canal and tympanic membrane appear normal  Eyes: pupils reactive bilaterally  Neck: No thyroidmegaly  Cardiovascular: nl s1 s2,+ holosystolic murmur heard  Pulmonary/Chest: CTA bilaterally with no wheezes  Abdominal: Soft NT normal Bowel sounds  Extremities: no pedal edema   Neurological:  no weakness in UE and LE, reflexes are normal  Skin: no rashes or bruises on visualized skin, not undressed   Psychiatric:normal mood              [1]   Allergies  Allergen Reactions    Terbinafine HIVES    Xanax [Alprazolam] OTHER (SEE COMMENTS)     Dry mouth  Aching joints  -- occurs only with regular use of 0.5 mg+   [2]   Current Outpatient Medications on File Prior to Visit   Medication Sig Dispense Refill    NON FORMULARY Take 1 tablet by mouth daily. AKILA QUINONE  COMPLEX K2-7       Cholecalciferol (VITAMIN D3) 25 MCG (1000 UT) Oral Cap Take 1 tablet by mouth daily.      Ascorbic Acid (VITAMIN C) 100 MG Oral Tab Take 1 tablet (100 mg total) by mouth daily.      Nutritional Supplements (JUICE PLUS FIBRE OR) Take 1 tablet by mouth daily.      omega-3 fatty acids 1000 MG Oral Cap Take 1,000 mg by mouth daily.      Calcium Carb-Cholecalciferol (OYSTER SHELL CALCIUM) 500-400 MG-UNIT Oral Tab  (Patient not taking: Reported on 10/21/2024)       No current facility-administered medications on file prior to visit.

## 2024-10-22 DIAGNOSIS — Z12.31 ENCOUNTER FOR SCREENING MAMMOGRAM FOR MALIGNANT NEOPLASM OF BREAST: Primary | ICD-10-CM

## 2024-10-25 ENCOUNTER — HOSPITAL ENCOUNTER (OUTPATIENT)
Dept: MAMMOGRAPHY | Age: 64
End: 2024-10-25
Attending: NURSE PRACTITIONER

## 2024-10-25 DIAGNOSIS — Z12.31 ENCOUNTER FOR SCREENING MAMMOGRAM FOR MALIGNANT NEOPLASM OF BREAST: ICD-10-CM

## 2024-10-25 PROCEDURE — 77067 SCR MAMMO BI INCL CAD: CPT

## 2025-03-08 ENCOUNTER — WALK IN (OUTPATIENT)
Dept: URGENT CARE | Age: 65
End: 2025-03-08

## 2025-03-08 VITALS
TEMPERATURE: 98.7 F | HEART RATE: 89 BPM | DIASTOLIC BLOOD PRESSURE: 80 MMHG | SYSTOLIC BLOOD PRESSURE: 122 MMHG | RESPIRATION RATE: 14 BRPM | OXYGEN SATURATION: 99 %

## 2025-03-08 DIAGNOSIS — H00.011 HORDEOLUM EXTERNUM OF RIGHT UPPER EYELID: Primary | ICD-10-CM

## 2025-03-08 RX ORDER — CEPHALEXIN 500 MG/1
500 CAPSULE ORAL 3 TIMES DAILY
Qty: 21 CAPSULE | Refills: 0 | Status: SHIPPED | OUTPATIENT
Start: 2025-03-08 | End: 2025-03-15

## 2025-03-08 RX ORDER — CLOBETASOL PROPIONATE 0.5 MG/G
CREAM TOPICAL
COMMUNITY

## 2025-03-08 RX ORDER — FLUTICASONE PROPIONATE 50 MCG
2 SPRAY, SUSPENSION (ML) NASAL DAILY
COMMUNITY

## 2025-03-08 RX ORDER — CYCLOBENZAPRINE HCL 10 MG
TABLET ORAL
COMMUNITY

## 2025-03-08 RX ORDER — HYDROCORTISONE 25 MG/G
OINTMENT TOPICAL
COMMUNITY

## 2025-03-08 RX ORDER — ALBUTEROL SULFATE 90 UG/1
2 INHALANT RESPIRATORY (INHALATION) EVERY 6 HOURS PRN
COMMUNITY

## 2025-03-08 RX ORDER — CHLORAL HYDRATE 500 MG
1000 CAPSULE ORAL DAILY
COMMUNITY

## 2025-03-08 RX ORDER — ALPRAZOLAM 0.25 MG
TABLET ORAL
COMMUNITY

## 2025-03-08 RX ORDER — CALCIUM CARBONATE/VITAMIN D3 500 MG-10
TABLET ORAL
COMMUNITY

## 2025-03-08 RX ORDER — POLYMYXIN B SULFATE AND TRIMETHOPRIM 1; 10000 MG/ML; [USP'U]/ML
SOLUTION OPHTHALMIC
Qty: 10 ML | Refills: 0 | Status: SHIPPED | OUTPATIENT
Start: 2025-03-08

## 2025-07-24 NOTE — TELEPHONE ENCOUNTER
Pt states she has had chest congestion for 2 wks and has tried taking mucinex 600 mg but it has not helped completely. Pt is wanting to know what is suggested? Can a med be sent? Should she go to [facility]? Please advise.      [id number] [place], [place] - [facility] [address], [phone], [phone] [[id number]]

## 2025-07-24 NOTE — TELEPHONE ENCOUNTER
Spoke with patient. Patient states that she's had a chest congestion for about 2 weeks. She doesn't cough a lot, but she is experiencing cough congestion. No head congestion or cold symptoms, it's just in her chest. She is not around other people who are sick. She has had a productive cough that causes her to cough up yellow-greenish phlegm. Pt denies fevers. She has tried Mucinex 600mg which has helped her cough out phlegm, and has been drinking hot tea/water, and uses a cough drop with some relief. Pt states that he chest hurts, even when she's not coughing and nothing feels right.     This RN advised pt to continue with supportive care at home and come in to be evaluated. Pt scheduled for appt and reminded to arrive 15 min early. If symptoms worsen, pt to go to [facility]. Pt v/u.     RP: Any further recs? OK to keep appointment?     Future Appointments   Date Time Provider Department Center   7/25/2025  1:45 PM Clara Jaimes MD [facility] 8 [facility] [place]   8/5/2025  9:30 AM Cyrus Woodward MD [facility]

## 2025-07-25 NOTE — PROGRESS NOTES
Britany Hairston is a 64 year old female.   HPI:   Patient presents to discuss several issues.    Acute issues:  She has been dealing with chest congestion for the past 2 weeks.  No sick contacts.  Productive cough (intermittent cough).  Has coughing fits.  No shortness of breath.  Some pleuritic chest pain when she is having coughing fits.  No fevers/chills or acute night sweats.      Blood pressure borderline today on initial reading.    Past medical, family, surgical and social history were reviewed as listed in the chart, and are unchanged from previous visit.    REVIEW OF SYSTEMS:   GENERAL/ const: no fevers/chills, no unintentional weight loss  SKIN: denies any unusual skin lesions  EYES:no vision problems  HEENT: hoarseness; denies sinus pain or sinus tenderness  LUNGS: persistent cough, chest congestion; denies shortness of breath   CARDIOVASCULAR: denies chest pain  GI: denies nausea/emesis/ abdominal pain diarrhea constipation  : denies dysuria   MUSCULOSKELETAL: no acute arthralgias  NEURO: denies headaches  PSYCHIATRIC: denies issues  ENDOCRINE: no hot/cold intolerance  ALLERGY: Allergies[1]  PAST HISTORY:     Medications - Current[2]  Medical:  has a past medical history of Abdominal hernia, Abdominal pain (2019), Allergic rhinitis, Anemia, unspecified, Arthritis, Back pain, Belching, Bereavement, uncomplicated (06/30/2011), Bloating, Esophageal reflux, Flatulence/gas pain/belching, JOANA (generalized anxiety disorder) (7/23/2020), Heart palpitations (1981), Heavy menses, Hemorrhoids, Hyperlipidemia, Hyperthyroidism, Indigestion, Mouth dryness (11/01/2011), Muscle spasm of back (7/23/2020), Myalgia and myositis, unspecified (11/01/2011), Night sweats, Nonspecific abnormal findings on imaging of lung (9/21/2021), Open wound of toe(s), without mention of complication (06/30/2011), Osteoarthritis, Pain in joint, multiple sites (11/01/2011), Painful urination, Phlebitis and thrombophlebitis of superficial  vessels of lower extremities (2011), Positive CIARA (antinuclear antibody) (2011), Presence of other cardiac implants and grafts, PVC (premature ventricular contraction) (2017), Scleritis, unspecified (2011), Unspecified adverse effect of unspecified drug, medicinal and biological substance (2011), and Wears glasses (80's).  Surgical:  has a past surgical history that includes colonoscopy ( per pt); colonoscopy (2021); other surgical history; other surgical history (2012); other surgical history (2021); and  (1985, 1988, 1990).  Family: family history includes Alcohol and Other Disorders Associated in her brother, brother, father, sister, sister, and sister; Asthma in her brother; Cancer in her father; Depression in her sister and sister; Heart Attack in her brother; Heart Disease in her maternal grandmother and another family member; Heart Disorder in her brother, maternal grandmother, and mother; Hypertension in her brother, brother, father, mother, and sister; Lipids in her mother; Mental Disorder in her maternal grandmother; Obesity in her mother; Other in her brother, brother, and mother; Thyroid Disorder in her mother.  Social:  reports that she has never smoked. She has never used smokeless tobacco. She reports current alcohol use. She reports that she does not use drugs.  Wt Readings from Last 6 Encounters:   25 141 lb 12.8 oz (64.3 kg)   10/21/24 149 lb (67.6 kg)   24 145 lb 12.8 oz (66.1 kg)   24 149 lb (67.6 kg)   23 145 lb 6.4 oz (66 kg)   10/19/23 143 lb 6.4 oz (65 kg)       EXAM:   /78 (BP Location: Left arm, Patient Position: Sitting, Cuff Size: adult)   Pulse 59   Temp 97.6 °F (36.4 °C) (Temporal)   Resp 12   Wt 141 lb 12.8 oz (64.3 kg)   LMP 2012   SpO2 100%   Breastfeeding No   BMI 22.21 kg/m²   GENERAL: Alert and oriented, well developed, well nourished,in no apparent distress  HEENT:  atraumatic, PERRLA, EOMI, normal lid and conjunctiva, normal external ear canals and tympanic membranes, normal pharynx, no sinus tenderness  NECK: supple, no jvd, no thyromegaly  LUNGS: clear to auscultation bilaterally, no wheezing/rubs  CARDIO: RRR without murmurs.  No clubbing, cyanosis or edema.  GI: soft non tender nondistended no hepatosplenomegaly, bowel sounds throughout  NEURO: CN II-XII intact, 5/5 strength all extremities  PSYCH: pleasant, appropriate mood and affect  ASSESSMENT AND PLAN:   1. Subacute cough  Patient with productive cough x 2 weeks. No improvement with OTC therapies.  No sinus/URI symptoms.  Unremarkable HEENT/respiratory examinations.  Will start on guaifenesin-codeine syrup, benzonatate.  Advised patient not to drive after taking guaifenesin-codeine syrup due to potential drowsiness.  Advised to start antibiotics (Augmentin) if symptoms are not better by next week.  May consider chest x-ray if symptoms persist.  - guaiFENesin-codeine 100-10 MG/5ML Oral Solution; Take 5 mL by mouth nightly as needed for cough or congestion.  Dispense: 118 mL; Refill: 0  - benzonatate 100 MG Oral Cap; Take 1 capsule (100 mg total) by mouth 3 (three) times daily as needed.  Dispense: 30 capsule; Refill: 0  - amoxicillin clavulanate 875-125 MG Oral Tab; Take 1 tablet by mouth 2 (two) times daily for 7 days.  Dispense: 14 tablet; Refill: 0    Blood pressure initially elevated, repeat reading reasonable.  Will monitor.  Patient Care Team:  Clara Jaimes MD as PCP - General (Internal Medicine)  Cameron Han MD as Consulting Physician (Cardiovascular Diseases)  Erick Molina as Consulting Physician (CHIROPRACTOR)  Petrona Starkey MD as Consulting Physician (NEUROLOGY)  Dorota Alejo APRN as Nurse Practitioner (OBSTETRICS & GYNECOLOGY)  The patient indicates understanding of these issues and agrees to the plan.  The patient is asked to return to clinic in 3 months for follow up on chronic  issues/CPX, or earlier if acute issues arise.    Clara Jaimes MD           [1]   Allergies  Allergen Reactions    Terbinafine HIVES    Xanax [Alprazolam] OTHER (SEE COMMENTS)     Dry mouth  Aching joints  -- occurs only with regular use of 0.5 mg+   [2]   Current Outpatient Medications:     conjugated estrogens 0.625 MG/GM Vaginal Cream, Apply 0.5 to 1 gram topically 3 times a week at night, Disp: , Rfl:     hydrocortisone 2.5 % External Ointment, hydrocortisone 2.5 % topical ointment  APPLY TOPICALLY TO THE AFFECTED AREA TWICE DAILY, Disp: , Rfl:     fluticasone propionate 50 MCG/ACT Nasal Suspension, 2 sprays by Nasal route daily., Disp: , Rfl:     guaiFENesin-codeine 100-10 MG/5ML Oral Solution, Take 5 mL by mouth nightly as needed for cough or congestion., Disp: 118 mL, Rfl: 0    benzonatate 100 MG Oral Cap, Take 1 capsule (100 mg total) by mouth 3 (three) times daily as needed., Disp: 30 capsule, Rfl: 0    amoxicillin clavulanate 875-125 MG Oral Tab, Take 1 tablet by mouth 2 (two) times daily for 7 days., Disp: 14 tablet, Rfl: 0    NON FORMULARY, Take 1 tablet by mouth daily. AKILA QUINONE COMPLEX K2-7, Disp: , Rfl:     Cholecalciferol (VITAMIN D3) 25 MCG (1000 UT) Oral Cap, Take 1 tablet by mouth daily., Disp: , Rfl:     Ascorbic Acid (VITAMIN C) 100 MG Oral Tab, Take 1 tablet (100 mg total) by mouth daily., Disp: , Rfl:     Calcium Carb-Cholecalciferol (OYSTER SHELL CALCIUM) 500-400 MG-UNIT Oral Tab, , Disp: , Rfl:     Nutritional Supplements (JUICE PLUS FIBRE OR), Take 1 tablet by mouth daily., Disp: , Rfl:     omega-3 fatty acids 1000 MG Oral Cap, Take 1,000 mg by mouth daily., Disp: , Rfl:

## 2025-07-25 NOTE — PATIENT INSTRUCTIONS
- Start congestion/cough medication, guaifenesin-codeine.  Take nightly.  This medication can make you drowsy, so do not drive after taking it  - Take benzonatate cough capsules during the daytime as needed  - Start antibiotics (Augmentin) if your symptoms are not better by early next week  - Follow up in late October/November for your physical; follow up earlier as needed.    It was a pleasure seeing you in the clinic today.  Thank you for choosing the [facility] [place] office for your healthcare needs. Please call at [phone] with any questions or concerns.    Clara Jaimes MD

## (undated) DIAGNOSIS — Z11.1 SCREENING FOR TUBERCULOSIS: ICD-10-CM

## (undated) DIAGNOSIS — Z20.822 EXPOSURE TO COVID-19 VIRUS: Primary | ICD-10-CM

## (undated) DIAGNOSIS — Z00.00 LABORATORY EXAM ORDERED AS PART OF ROUTINE GENERAL MEDICAL EXAMINATION: Primary | ICD-10-CM

## (undated) NOTE — Clinical Note
I saw Harmonys Wagner in the [facility] in [facility] today for cough,  she was treated with Tessalon. Candace Edward will follow up with you if no better or as needed.  Thank you for the opportunity to care for ZACK Corbett

## (undated) NOTE — MR AVS SNAPSHOT
[place]  [address] [facility] [address]  [address] [facility] [address] [phone]               Thank you for choosing us for your health care visit with Viviana Mahoney MD.  We are glad to serve you and happy to provide you with this summa [Z00.00]                 Follow-up Instructions     Return in about 1 year (around 5/9/2018).          Reason for Today's Visit     Stress           Medical Issues Discussed Today     Routine general medical examination at a health care facility    -  [facility] [employer] questions? Call [phone] for help. [employer] is NOT to be used for urgent needs. For medical emergencies, dial 911.            Visit [facility] online at  [email or web address]

## (undated) NOTE — LETTER
[address] [place] [address] [address]  [phone]        Dustin Ríos Daily        [birth year]/4[birth year]2017  [address]  [place] [address]         Dear Zandra Mera,       After reviewing our records, it h

## (undated) NOTE — Clinical Note
Please call the office of Dr. Veronique Griffin (OB/GYN) in [facility],  # [phone]. Please request copy of patient's most recent mammogram for our records. Thanks. Dr. Carissa Gonzalez

## (undated) NOTE — LETTER
08/19/21        Annie Espinal Daily  [address]      Dear Sonal Lodnono,    [address] [facility] records indicate that you have outstanding lab work and or testing that was ordered for you and has not yet been completed:  Orders Placed This Encounter